# Patient Record
Sex: FEMALE | Race: WHITE | Employment: FULL TIME | URBAN - METROPOLITAN AREA
[De-identification: names, ages, dates, MRNs, and addresses within clinical notes are randomized per-mention and may not be internally consistent; named-entity substitution may affect disease eponyms.]

---
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
## 2018-06-29 ENCOUNTER — HOSPITAL ENCOUNTER (OUTPATIENT)
Age: 18
Setting detail: SPECIMEN
Discharge: HOME | End: 2018-06-29
Payer: COMMERCIAL

## 2018-06-29 LAB
-: ABNORMAL
AMORPHOUS: ABNORMAL
BACTERIA: ABNORMAL
BILIRUBIN URINE: NEGATIVE
CASTS UA: ABNORMAL /LPF (ref 0–8)
COLOR: YELLOW
COMMENT UA: ABNORMAL
CRYSTALS, UA: ABNORMAL /HPF
EPITHELIAL CELLS UA: ABNORMAL /HPF (ref 0–5)
GLUCOSE URINE: NEGATIVE
KETONES, URINE: NEGATIVE
LEUKOCYTE ESTERASE, URINE: ABNORMAL
MUCUS: ABNORMAL
NITRITE, URINE: NEGATIVE
OTHER OBSERVATIONS UA: ABNORMAL
PH UA: 5.5 (ref 5–8)
PROTEIN UA: NEGATIVE
RBC UA: ABNORMAL /HPF (ref 0–4)
RENAL EPITHELIAL, UA: ABNORMAL /HPF
SPECIFIC GRAVITY UA: 1.01 (ref 1–1.03)
TRICHOMONAS: ABNORMAL
TURBIDITY: ABNORMAL
URINE HGB: ABNORMAL
UROBILINOGEN, URINE: NORMAL
WBC UA: ABNORMAL /HPF (ref 0–5)
YEAST: ABNORMAL

## 2018-06-30 LAB
CULTURE: NORMAL
Lab: NORMAL
SPECIMEN DESCRIPTION: NORMAL
STATUS: NORMAL

## 2018-09-11 ENCOUNTER — HOSPITAL ENCOUNTER (OUTPATIENT)
Age: 18
Discharge: HOME | End: 2018-09-11
Payer: COMMERCIAL

## 2018-09-11 LAB
ABSOLUTE EOS #: 0.1 K/UL (ref 0–0.4)
ABSOLUTE IMMATURE GRANULOCYTE: ABNORMAL K/UL (ref 0–0.3)
ABSOLUTE LYMPH #: 1.7 K/UL (ref 1.2–5.2)
ABSOLUTE MONO #: 0.3 K/UL (ref 0.2–0.8)
ALBUMIN SERPL-MCNC: 4.5 G/DL (ref 3.5–5.2)
ALBUMIN/GLOBULIN RATIO: ABNORMAL (ref 1–2.5)
ALP BLD-CCNC: 69 U/L (ref 35–104)
ALT SERPL-CCNC: 11 U/L (ref 5–33)
ANION GAP SERPL CALCULATED.3IONS-SCNC: 11 MMOL/L (ref 9–17)
AST SERPL-CCNC: 14 U/L
BASOPHILS # BLD: 1 % (ref 0–2)
BASOPHILS ABSOLUTE: 0 K/UL (ref 0–0.2)
BILIRUB SERPL-MCNC: 0.2 MG/DL (ref 0.3–1.2)
BUN BLDV-MCNC: 13 MG/DL (ref 6–20)
BUN/CREAT BLD: 28 (ref 9–20)
CALCIUM SERPL-MCNC: 9.6 MG/DL (ref 8.6–10.4)
CHLORIDE BLD-SCNC: 104 MMOL/L (ref 98–107)
CO2: 25 MMOL/L (ref 20–31)
CREAT SERPL-MCNC: 0.46 MG/DL (ref 0.5–0.9)
DIFFERENTIAL TYPE: ABNORMAL
EOSINOPHILS RELATIVE PERCENT: 1 % (ref 1–4)
FERRITIN: 11 UG/L (ref 13–150)
GFR AFRICAN AMERICAN: >60 ML/MIN
GFR NON-AFRICAN AMERICAN: >60 ML/MIN
GFR SERPL CREATININE-BSD FRML MDRD: ABNORMAL ML/MIN/{1.73_M2}
GFR SERPL CREATININE-BSD FRML MDRD: ABNORMAL ML/MIN/{1.73_M2}
GLUCOSE BLD-MCNC: 95 MG/DL (ref 70–99)
HCT VFR BLD CALC: 38.2 % (ref 36–46)
HEMOGLOBIN: 12.5 G/DL (ref 12–16)
IMMATURE GRANULOCYTES: ABNORMAL %
IRON SATURATION: 16 % (ref 20–55)
IRON: 66 UG/DL (ref 37–145)
LYMPHOCYTES # BLD: 33 % (ref 25–45)
MCH RBC QN AUTO: 27.1 PG (ref 26–34)
MCHC RBC AUTO-ENTMCNC: 32.8 G/DL (ref 31–37)
MCV RBC AUTO: 82.6 FL (ref 80–100)
MONOCYTES # BLD: 6 % (ref 2–8)
NRBC AUTOMATED: ABNORMAL PER 100 WBC
PDW BLD-RTO: 14.6 % (ref 11.5–14.5)
PLATELET # BLD: 354 K/UL (ref 130–400)
PLATELET ESTIMATE: ABNORMAL
PMV BLD AUTO: ABNORMAL FL (ref 6–12)
POTASSIUM SERPL-SCNC: 4.1 MMOL/L (ref 3.7–5.3)
RBC # BLD: 4.63 M/UL (ref 4–5.2)
RBC # BLD: ABNORMAL 10*6/UL
SEG NEUTROPHILS: 59 % (ref 34–64)
SEGMENTED NEUTROPHILS ABSOLUTE COUNT: 3.2 K/UL (ref 1.8–8)
SODIUM BLD-SCNC: 140 MMOL/L (ref 135–144)
THYROXINE, FREE: 1.71 NG/DL (ref 0.93–1.7)
TOTAL IRON BINDING CAPACITY: 404 UG/DL (ref 250–450)
TOTAL PROTEIN: 7.5 G/DL (ref 6.4–8.3)
TSH SERPL DL<=0.05 MIU/L-ACNC: <0.01 MIU/L (ref 0.3–5)
UNSATURATED IRON BINDING CAPACITY: 338 UG/DL (ref 112–347)
VITAMIN B-12: 621 PG/ML (ref 232–1245)
WBC # BLD: 5.3 K/UL (ref 4.5–13.5)
WBC # BLD: ABNORMAL 10*3/UL

## 2018-09-11 PROCEDURE — 86376 MICROSOMAL ANTIBODY EACH: CPT

## 2018-09-11 PROCEDURE — 84443 ASSAY THYROID STIM HORMONE: CPT

## 2018-09-11 PROCEDURE — 82728 ASSAY OF FERRITIN: CPT

## 2018-09-11 PROCEDURE — 83550 IRON BINDING TEST: CPT

## 2018-09-11 PROCEDURE — 84439 ASSAY OF FREE THYROXINE: CPT

## 2018-09-11 PROCEDURE — 85025 COMPLETE CBC W/AUTO DIFF WBC: CPT

## 2018-09-11 PROCEDURE — 83540 ASSAY OF IRON: CPT

## 2018-09-11 PROCEDURE — 86800 THYROGLOBULIN ANTIBODY: CPT

## 2018-09-11 PROCEDURE — 82607 VITAMIN B-12: CPT

## 2018-09-11 PROCEDURE — 80053 COMPREHEN METABOLIC PANEL: CPT

## 2018-09-11 PROCEDURE — 36415 COLL VENOUS BLD VENIPUNCTURE: CPT

## 2018-09-11 PROCEDURE — 82306 VITAMIN D 25 HYDROXY: CPT

## 2018-09-12 LAB
THYROGLOBULIN AB: 57.5 IU/ML (ref 0–40)
THYROID PEROXIDASE (TPO) AB: 15.5 IU/ML (ref 0–35)

## 2018-09-14 LAB — VITAMIN D 25-HYDROXY: 41.9 NG/ML (ref 30–100)

## 2018-09-18 ENCOUNTER — HOSPITAL ENCOUNTER (OUTPATIENT)
Dept: ULTRASOUND IMAGING | Facility: CLINIC | Age: 18
Discharge: HOME | End: 2018-09-20
Payer: COMMERCIAL

## 2018-09-18 DIAGNOSIS — E05.90 THYROTOXICOSIS WITHOUT THYROID STORM, UNSPECIFIED THYROTOXICOSIS TYPE: ICD-10-CM

## 2018-09-18 PROCEDURE — 76536 US EXAM OF HEAD AND NECK: CPT

## 2018-10-08 ENCOUNTER — HOSPITAL ENCOUNTER (OUTPATIENT)
Age: 18
Discharge: HOME | End: 2018-10-08
Payer: COMMERCIAL

## 2018-10-08 LAB
IRON SATURATION: 10 % (ref 20–55)
IRON: 37 UG/DL (ref 37–145)
T3 FREE: 2.8 PG/ML (ref 2.02–4.43)
THYROXINE, FREE: 0.91 NG/DL (ref 0.93–1.7)
TOTAL IRON BINDING CAPACITY: 384 UG/DL (ref 250–450)
TSH SERPL DL<=0.05 MIU/L-ACNC: <0.01 MIU/L (ref 0.3–5)
UNSATURATED IRON BINDING CAPACITY: 347 UG/DL (ref 112–347)

## 2018-10-08 PROCEDURE — 36415 COLL VENOUS BLD VENIPUNCTURE: CPT

## 2018-10-08 PROCEDURE — 83540 ASSAY OF IRON: CPT

## 2018-10-08 PROCEDURE — 84445 ASSAY OF TSI GLOBULIN: CPT

## 2018-10-08 PROCEDURE — 84481 FREE ASSAY (FT-3): CPT

## 2018-10-08 PROCEDURE — 84439 ASSAY OF FREE THYROXINE: CPT

## 2018-10-08 PROCEDURE — 83550 IRON BINDING TEST: CPT

## 2018-10-08 PROCEDURE — 84443 ASSAY THYROID STIM HORMONE: CPT

## 2018-10-11 LAB — THYROID STIMULATING IMMUNOGLOB: 127 %

## 2018-11-27 ENCOUNTER — HOSPITAL ENCOUNTER (OUTPATIENT)
Age: 18
Discharge: HOME | End: 2018-11-27
Payer: COMMERCIAL

## 2018-11-27 LAB
THYROXINE, FREE: 0.77 NG/DL (ref 0.93–1.7)
TSH SERPL DL<=0.05 MIU/L-ACNC: 2.85 MIU/L (ref 0.3–5)

## 2018-11-27 PROCEDURE — 83540 ASSAY OF IRON: CPT

## 2018-11-27 PROCEDURE — 84439 ASSAY OF FREE THYROXINE: CPT

## 2018-11-27 PROCEDURE — 36415 COLL VENOUS BLD VENIPUNCTURE: CPT

## 2018-11-27 PROCEDURE — 83550 IRON BINDING TEST: CPT

## 2018-11-27 PROCEDURE — 84443 ASSAY THYROID STIM HORMONE: CPT

## 2018-11-27 PROCEDURE — 84481 FREE ASSAY (FT-3): CPT

## 2018-11-28 LAB
IRON SATURATION: 38 % (ref 20–55)
IRON: 134 UG/DL (ref 37–145)
T3 FREE: 2.49 PG/ML (ref 2.02–4.43)
TOTAL IRON BINDING CAPACITY: 357 UG/DL (ref 250–450)
UNSATURATED IRON BINDING CAPACITY: 223 UG/DL (ref 112–347)

## 2019-01-08 ENCOUNTER — HOSPITAL ENCOUNTER (OUTPATIENT)
Age: 19
Discharge: HOME | End: 2019-01-08
Payer: COMMERCIAL

## 2019-01-08 LAB
IRON SATURATION: 38 % (ref 20–55)
IRON: 134 UG/DL (ref 37–145)
T3 FREE: 2.63 PG/ML (ref 2.02–4.43)
THYROXINE, FREE: 0.71 NG/DL (ref 0.93–1.7)
TOTAL IRON BINDING CAPACITY: 350 UG/DL (ref 250–450)
TSH SERPL DL<=0.05 MIU/L-ACNC: 2.55 MIU/L (ref 0.3–5)
UNSATURATED IRON BINDING CAPACITY: 216 UG/DL (ref 112–347)

## 2019-01-08 PROCEDURE — 84439 ASSAY OF FREE THYROXINE: CPT

## 2019-01-08 PROCEDURE — 83550 IRON BINDING TEST: CPT

## 2019-01-08 PROCEDURE — 83540 ASSAY OF IRON: CPT

## 2019-01-08 PROCEDURE — 84443 ASSAY THYROID STIM HORMONE: CPT

## 2019-01-08 PROCEDURE — 36415 COLL VENOUS BLD VENIPUNCTURE: CPT

## 2019-01-08 PROCEDURE — 84481 FREE ASSAY (FT-3): CPT

## 2019-02-28 ENCOUNTER — TELEPHONE (OUTPATIENT)
Dept: OBGYN CLINIC | Age: 19
End: 2019-02-28

## 2019-03-04 ENCOUNTER — HOSPITAL ENCOUNTER (OUTPATIENT)
Age: 19
Setting detail: SPECIMEN
Discharge: HOME | End: 2019-03-04
Payer: COMMERCIAL

## 2019-03-04 ENCOUNTER — OFFICE VISIT (OUTPATIENT)
Dept: OBGYN CLINIC | Age: 19
End: 2019-03-04
Payer: COMMERCIAL

## 2019-03-04 VITALS
WEIGHT: 142 LBS | SYSTOLIC BLOOD PRESSURE: 112 MMHG | BODY MASS INDEX: 27.88 KG/M2 | DIASTOLIC BLOOD PRESSURE: 72 MMHG | HEIGHT: 60 IN

## 2019-03-04 DIAGNOSIS — Z00.00 ENCOUNTER FOR ANNUAL PHYSICAL EXAMINATION EXCLUDING GYNECOLOGICAL EXAMINATION IN A PATIENT OLDER THAN 17 YEARS: ICD-10-CM

## 2019-03-04 DIAGNOSIS — Z20.2 POSSIBLE EXPOSURE TO STD: ICD-10-CM

## 2019-03-04 DIAGNOSIS — Z20.2 POSSIBLE EXPOSURE TO STD: Primary | ICD-10-CM

## 2019-03-04 LAB
DIRECT EXAM: ABNORMAL
Lab: ABNORMAL
SPECIMEN DESCRIPTION: ABNORMAL

## 2019-03-04 PROCEDURE — 99395 PREV VISIT EST AGE 18-39: CPT | Performed by: OBSTETRICS & GYNECOLOGY

## 2019-03-04 RX ORDER — PROPYLTHIOURACIL 50 MG/1
TABLET ORAL 3 TIMES DAILY
COMMUNITY
End: 2020-03-06 | Stop reason: ALTCHOICE

## 2019-03-04 ASSESSMENT — ENCOUNTER SYMPTOMS
VOMITING: 0
DIARRHEA: 0
ABDOMINAL PAIN: 0
WHEEZING: 0
NAUSEA: 0
COUGH: 0
CONSTIPATION: 0

## 2019-03-05 ENCOUNTER — TELEPHONE (OUTPATIENT)
Dept: OBGYN CLINIC | Age: 19
End: 2019-03-05

## 2019-03-05 LAB
C TRACH DNA GENITAL QL NAA+PROBE: NEGATIVE
N. GONORRHOEAE DNA: NEGATIVE
SPECIMEN DESCRIPTION: NORMAL

## 2019-03-05 RX ORDER — METRONIDAZOLE 500 MG/1
500 TABLET ORAL 2 TIMES DAILY
Qty: 14 TABLET | Refills: 0 | Status: SHIPPED | OUTPATIENT
Start: 2019-03-05 | End: 2019-03-12

## 2019-03-18 ENCOUNTER — HOSPITAL ENCOUNTER (OUTPATIENT)
Age: 19
Discharge: HOME | End: 2019-03-18
Payer: COMMERCIAL

## 2019-03-18 LAB
T3 FREE: 2.81 PG/ML (ref 2.02–4.43)
THYROXINE, FREE: 0.84 NG/DL (ref 0.93–1.7)
TSH SERPL DL<=0.05 MIU/L-ACNC: 2.31 MIU/L (ref 0.3–5)

## 2019-03-18 PROCEDURE — 84481 FREE ASSAY (FT-3): CPT

## 2019-03-18 PROCEDURE — 36415 COLL VENOUS BLD VENIPUNCTURE: CPT

## 2019-03-18 PROCEDURE — 84439 ASSAY OF FREE THYROXINE: CPT

## 2019-03-18 PROCEDURE — 84443 ASSAY THYROID STIM HORMONE: CPT

## 2019-06-05 ENCOUNTER — HOSPITAL ENCOUNTER (OUTPATIENT)
Age: 19
Discharge: HOME | End: 2019-06-05
Payer: COMMERCIAL

## 2019-06-05 LAB
T3 FREE: 2.98 PG/ML (ref 2.02–4.43)
THYROXINE, FREE: 0.91 NG/DL (ref 0.93–1.7)
TSH SERPL DL<=0.05 MIU/L-ACNC: 1.58 MIU/L (ref 0.3–5)

## 2019-06-05 PROCEDURE — 84481 FREE ASSAY (FT-3): CPT

## 2019-06-05 PROCEDURE — 36415 COLL VENOUS BLD VENIPUNCTURE: CPT

## 2019-06-05 PROCEDURE — 84443 ASSAY THYROID STIM HORMONE: CPT

## 2019-06-05 PROCEDURE — 84439 ASSAY OF FREE THYROXINE: CPT

## 2019-07-20 ENCOUNTER — HOSPITAL ENCOUNTER (OUTPATIENT)
Age: 19
Discharge: HOME | End: 2019-07-20
Payer: COMMERCIAL

## 2019-07-20 LAB
T3 FREE: 2.93 PG/ML (ref 2.02–4.43)
THYROXINE, FREE: 1 NG/DL (ref 0.93–1.7)
TSH SERPL DL<=0.05 MIU/L-ACNC: 1.35 MIU/L (ref 0.3–5)

## 2019-07-20 PROCEDURE — 36415 COLL VENOUS BLD VENIPUNCTURE: CPT

## 2019-07-20 PROCEDURE — 84439 ASSAY OF FREE THYROXINE: CPT

## 2019-07-20 PROCEDURE — 84443 ASSAY THYROID STIM HORMONE: CPT

## 2019-07-20 PROCEDURE — 84481 FREE ASSAY (FT-3): CPT

## 2019-10-15 ENCOUNTER — OFFICE VISIT (OUTPATIENT)
Dept: OBGYN CLINIC | Age: 19
End: 2019-10-15
Payer: COMMERCIAL

## 2019-10-15 VITALS
BODY MASS INDEX: 29.64 KG/M2 | WEIGHT: 151 LBS | HEIGHT: 60 IN | SYSTOLIC BLOOD PRESSURE: 114 MMHG | DIASTOLIC BLOOD PRESSURE: 82 MMHG

## 2019-10-15 DIAGNOSIS — N92.3 IMB (INTERMENSTRUAL BLEEDING): ICD-10-CM

## 2019-10-15 DIAGNOSIS — Z97.5 IUD (INTRAUTERINE DEVICE) IN PLACE: Primary | ICD-10-CM

## 2019-10-15 PROCEDURE — 99213 OFFICE O/P EST LOW 20 MIN: CPT | Performed by: NURSE PRACTITIONER

## 2019-10-15 RX ORDER — NAPROXEN 500 MG/1
500 TABLET ORAL 2 TIMES DAILY WITH MEALS
Qty: 60 TABLET | Refills: 1 | Status: SHIPPED | OUTPATIENT
Start: 2019-10-15 | End: 2020-05-11

## 2019-10-15 ASSESSMENT — ENCOUNTER SYMPTOMS
BACK PAIN: 0
SHORTNESS OF BREATH: 0
COUGH: 0
ABDOMINAL PAIN: 0
ABDOMINAL DISTENTION: 0
DIARRHEA: 0
CONSTIPATION: 0

## 2020-01-23 ENCOUNTER — HOSPITAL ENCOUNTER (OUTPATIENT)
Age: 20
Discharge: HOME | End: 2020-01-23
Payer: COMMERCIAL

## 2020-01-23 LAB
T3 FREE: 2.69 PG/ML (ref 2.02–4.43)
THYROXINE, FREE: 0.93 NG/DL (ref 0.93–1.7)
TSH SERPL DL<=0.05 MIU/L-ACNC: 1.78 MIU/L (ref 0.3–5)

## 2020-01-23 PROCEDURE — 84481 FREE ASSAY (FT-3): CPT

## 2020-01-23 PROCEDURE — 84443 ASSAY THYROID STIM HORMONE: CPT

## 2020-01-23 PROCEDURE — 36415 COLL VENOUS BLD VENIPUNCTURE: CPT

## 2020-01-23 PROCEDURE — 84439 ASSAY OF FREE THYROXINE: CPT

## 2020-03-06 ENCOUNTER — OFFICE VISIT (OUTPATIENT)
Dept: PRIMARY CARE CLINIC | Age: 20
End: 2020-03-06
Payer: COMMERCIAL

## 2020-03-06 VITALS
WEIGHT: 155 LBS | DIASTOLIC BLOOD PRESSURE: 66 MMHG | HEIGHT: 59 IN | HEART RATE: 85 BPM | RESPIRATION RATE: 16 BRPM | BODY MASS INDEX: 31.25 KG/M2 | SYSTOLIC BLOOD PRESSURE: 100 MMHG | OXYGEN SATURATION: 98 %

## 2020-03-06 PROBLEM — E05.00 GRAVES DISEASE: Status: ACTIVE | Noted: 2020-03-06

## 2020-03-06 PROBLEM — E05.90 HYPERTHYROIDISM: Status: ACTIVE | Noted: 2018-09-11

## 2020-03-06 PROCEDURE — 99203 OFFICE O/P NEW LOW 30 MIN: CPT | Performed by: INTERNAL MEDICINE

## 2020-03-06 ASSESSMENT — PATIENT HEALTH QUESTIONNAIRE - PHQ9
SUM OF ALL RESPONSES TO PHQ QUESTIONS 1-9: 0
SUM OF ALL RESPONSES TO PHQ QUESTIONS 1-9: 0
2. FEELING DOWN, DEPRESSED OR HOPELESS: 0
SUM OF ALL RESPONSES TO PHQ9 QUESTIONS 1 & 2: 0
1. LITTLE INTEREST OR PLEASURE IN DOING THINGS: 0

## 2020-03-08 ENCOUNTER — HOSPITAL ENCOUNTER (OUTPATIENT)
Age: 20
Discharge: HOME | End: 2020-03-08
Payer: COMMERCIAL

## 2020-03-08 LAB
GLUCOSE BLD-MCNC: 95 MG/DL (ref 70–99)
INSULIN COMMENT: NORMAL
INSULIN REFERENCE RANGE:: NORMAL
INSULIN: 8.9 MU/L
T3 FREE: 2.84 PG/ML (ref 2.02–4.43)
THYROXINE, FREE: 1.01 NG/DL (ref 0.93–1.7)
TSH SERPL DL<=0.05 MIU/L-ACNC: 1.06 MIU/L (ref 0.3–5)

## 2020-03-08 PROCEDURE — 84443 ASSAY THYROID STIM HORMONE: CPT

## 2020-03-08 PROCEDURE — 83525 ASSAY OF INSULIN: CPT

## 2020-03-08 PROCEDURE — 82947 ASSAY GLUCOSE BLOOD QUANT: CPT

## 2020-03-08 PROCEDURE — 36415 COLL VENOUS BLD VENIPUNCTURE: CPT

## 2020-03-08 PROCEDURE — 84439 ASSAY OF FREE THYROXINE: CPT

## 2020-03-08 PROCEDURE — 84481 FREE ASSAY (FT-3): CPT

## 2020-03-09 ENCOUNTER — HOSPITAL ENCOUNTER (OUTPATIENT)
Age: 20
Setting detail: SPECIMEN
Discharge: HOME | End: 2020-03-09
Payer: COMMERCIAL

## 2020-03-09 PROCEDURE — 82533 TOTAL CORTISOL: CPT

## 2020-03-11 LAB — CORTISOL SALIVARY: 0.03 UG/DL

## 2020-03-15 ASSESSMENT — ENCOUNTER SYMPTOMS
CONSTIPATION: 0
ABDOMINAL PAIN: 0
SINUS PRESSURE: 0
BACK PAIN: 0
NAUSEA: 0
SINUS PAIN: 0
VOMITING: 0
COUGH: 0
ABDOMINAL DISTENTION: 0
SHORTNESS OF BREATH: 0
DIARRHEA: 0
WHEEZING: 0

## 2020-03-15 NOTE — PROGRESS NOTES
[facility] PRIMARY CARE  [address]   [address]  [address] [address]  Dept: [phone]  Dept Fax: [phone]    Quita Pitts is a 22 y.o. female who presents today for her medical conditions/complaints as noted below. Chief Complaint   Patient presents with    New Patient     Establish Care, pt c/o lightheadedness       HPI:     This is a 75-year-old female who is here to establish care. She has past medical history of Graves' disease/hyper thyroidism. Today her TSH is normal and she has been doing well. Her blood pressure is low around his 100/66 and pulse at 85 and she complains of lightheadedness occasionally. No other complaints or concerns. BMI is at 31 and advised her to diet and exercise.       Hemoglobin A1C (%)   Date Value   08/22/2015 4.9             ( goal A1C is < 7)   No results found for: LABMICR  LDL Cholesterol (mg/dL)   Date Value   08/22/2015 136 (H)       (goal LDL is <100)   AST (U/L)   Date Value   09/11/2018 14     ALT (U/L)   Date Value   09/11/2018 11     BUN (mg/dL)   Date Value   09/11/2018 13     BP Readings from Last 3 Encounters:   03/06/20 100/66   10/15/19 114/82   03/04/19 112/72          (goal 120/80)    Past Medical History:   Diagnosis Date    Thyroid disease 10/2018    Graves Disease      Past Surgical History:   Procedure Laterality Date    BREAST ENHANCEMENT SURGERY  2016    INTRAUTERINE DEVICE INSERTION  08/2016    Paragaadlen Borrego Nose    TYMPANOSTOMY TUBE PLACEMENT      WISDOM TOOTH EXTRACTION         Family History   Problem Relation Age of Onset    Other Father         Autoimmune disease    Thyroid Disease Father     Asthma Brother        Social History     Tobacco Use    Smoking status: Never Smoker    Smokeless tobacco: Never Used   Substance Use Topics    Alcohol use: Yes     Comment: 1-2 drinks weekly      Current Outpatient Medications   Medication Sig Dispense Refill    naproxen (NAPROSYN) 500 MG tablet Take 1 tablet by mouth 2 times daily (with meals) 60 tablet 1     No current facility-administered medications for this visit. Allergies   Allergen Reactions    Latex     Tapazole [Methimazole] Hives       Health Maintenance   Topic Date Due    Hepatitis A vaccine (2 of 2 - 2-dose series) 11/04/2010    HIV screen  07/27/2013    HPV vaccine (3 - 3-dose series) 12/19/2014    Cervical cancer screen  07/27/2019    Chlamydia screen  03/04/2020    Flu vaccine (1) 03/06/2021 (Originally 9/1/2019)    DTaP/Tdap/Td vaccine (7 - Td) 05/04/2020    TSH testing  03/08/2021    Shingles Vaccine (1 of 2) 07/27/2048    Hepatitis B vaccine  Completed    Hib vaccine  Completed    Varicella vaccine  Completed    Meningococcal (ACWY) vaccine  Completed    Pneumococcal 0-64 years Vaccine  Aged Out       Subjective:      Review of Systems   Constitutional: Negative for activity change, appetite change, chills, fatigue and fever. HENT: Negative for congestion, ear pain, hearing loss, nosebleeds, sinus pressure, sinus pain and sneezing. Eyes: Negative for visual disturbance. Respiratory: Negative for cough, shortness of breath and wheezing. Cardiovascular: Negative for chest pain and palpitations. Gastrointestinal: Negative for abdominal distention, abdominal pain, constipation, diarrhea, nausea and vomiting. Endocrine: Negative for cold intolerance, heat intolerance, polydipsia, polyphagia and polyuria. Genitourinary: Negative for difficulty urinating, menstrual problem, vaginal bleeding and vaginal discharge. Musculoskeletal: Negative for back pain and joint swelling. Skin: Negative for rash. Neurological: Negative for numbness and headaches. Psychiatric/Behavioral: Negative for sleep disturbance. The patient is not nervous/anxious. All other systems reviewed and are negative. Objective:     Physical Exam  Vitals signs and nursing note reviewed.    Constitutional:       General:

## 2020-04-02 ENCOUNTER — TELEMEDICINE (OUTPATIENT)
Dept: OBGYN CLINIC | Age: 20
End: 2020-04-02
Payer: COMMERCIAL

## 2020-04-02 PROCEDURE — 99213 OFFICE O/P EST LOW 20 MIN: CPT | Performed by: OBSTETRICS & GYNECOLOGY

## 2020-04-02 RX ORDER — METRONIDAZOLE 500 MG/1
500 TABLET ORAL 2 TIMES DAILY
Qty: 14 TABLET | Refills: 0 | Status: SHIPPED | OUTPATIENT
Start: 2020-04-02 | End: 2020-04-09

## 2020-04-02 ASSESSMENT — ENCOUNTER SYMPTOMS
DIARRHEA: 0
CONSTIPATION: 0
COUGH: 0
ABDOMINAL PAIN: 0
WHEEZING: 0
VOMITING: 0
NAUSEA: 0

## 2020-05-11 ENCOUNTER — OFFICE VISIT (OUTPATIENT)
Dept: OBGYN CLINIC | Age: 20
End: 2020-05-11
Payer: COMMERCIAL

## 2020-05-11 ENCOUNTER — HOSPITAL ENCOUNTER (OUTPATIENT)
Age: 20
Setting detail: SPECIMEN
Discharge: HOME | End: 2020-05-11
Payer: COMMERCIAL

## 2020-05-11 VITALS
WEIGHT: 153 LBS | SYSTOLIC BLOOD PRESSURE: 120 MMHG | HEIGHT: 60 IN | BODY MASS INDEX: 30.04 KG/M2 | DIASTOLIC BLOOD PRESSURE: 84 MMHG

## 2020-05-11 PROCEDURE — 99395 PREV VISIT EST AGE 18-39: CPT | Performed by: OBSTETRICS & GYNECOLOGY

## 2020-05-11 ASSESSMENT — ENCOUNTER SYMPTOMS
DIARRHEA: 0
COUGH: 0
NAUSEA: 0
VOMITING: 0
ABDOMINAL PAIN: 0
CONSTIPATION: 0
WHEEZING: 0

## 2020-05-11 NOTE — PROGRESS NOTES
chills and fever. HENT: Negative for hearing loss. Respiratory: Negative for cough and wheezing. Cardiovascular: Negative for chest pain and palpitations. Gastrointestinal: Negative for abdominal pain, constipation, diarrhea, nausea and vomiting. Genitourinary: Negative for dysuria, frequency and urgency. Musculoskeletal: Negative for myalgias. Skin: Negative for rash. Neurological: Negative for dizziness, weakness and headaches. Hematological: Does not bruise/bleed easily. Psychiatric/Behavioral: Negative for suicidal ideas. /84 (Position: Sitting, Cuff Size: Medium Adult)   Ht 5' (1.524 m)   Wt 153 lb (69.4 kg)   LMP 05/03/2020   BMI 29.88 kg/m²       Physical Exam  Constitutional:       Appearance: She is well-developed. HENT:      Head: Normocephalic. Neck:      Thyroid: No thyromegaly. Vascular: No JVD. Cardiovascular:      Rate and Rhythm: Normal rate and regular rhythm. Pulmonary:      Effort: Pulmonary effort is normal. No respiratory distress. Breath sounds: Normal breath sounds. No wheezing or rales. Chest:      Chest wall: No tenderness. Breasts: Breasts are symmetrical.         Right: No inverted nipple, mass, nipple discharge, skin change or tenderness. Left: No inverted nipple, mass, nipple discharge, skin change or tenderness. Comments: Well healed scars from breast augmentation  Abdominal:      General: Bowel sounds are normal. There is no distension. Palpations: Abdomen is soft. Tenderness: There is no abdominal tenderness. Genitourinary:     Exam position: Supine. Labia:         Right: No rash, tenderness, lesion or injury. Left: No rash, tenderness, lesion or injury. Vagina: Normal. No foreign body. No erythema or bleeding. Cervix: No cervical motion tenderness, discharge or friability. Uterus: Not deviated, not enlarged, not fixed and not tender.        Adnexa:         Right:

## 2020-05-12 ENCOUNTER — TELEPHONE (OUTPATIENT)
Dept: OBGYN CLINIC | Age: 20
End: 2020-05-12

## 2020-05-12 LAB
C TRACH DNA GENITAL QL NAA+PROBE: NEGATIVE
DIRECT EXAM: ABNORMAL
Lab: ABNORMAL
N. GONORRHOEAE DNA: NEGATIVE
SPECIMEN DESCRIPTION: ABNORMAL
SPECIMEN DESCRIPTION: NORMAL

## 2020-05-12 RX ORDER — METRONIDAZOLE 500 MG/1
500 TABLET ORAL 2 TIMES DAILY
Qty: 20 TABLET | Refills: 0 | Status: SHIPPED
Start: 2020-05-12 | End: 2020-05-22 | Stop reason: SINTOL

## 2020-05-14 LAB — CYTOLOGY REPORT: NORMAL

## 2020-05-15 ENCOUNTER — TELEPHONE (OUTPATIENT)
Dept: OBGYN CLINIC | Age: 20
End: 2020-05-15

## 2020-05-15 RX ORDER — METRONIDAZOLE 7.5 MG/G
GEL VAGINAL
Qty: 1 TUBE | Refills: 0 | Status: SHIPPED | OUTPATIENT
Start: 2020-05-15 | End: 2020-05-22

## 2020-05-15 RX ORDER — ONDANSETRON 4 MG/1
4 TABLET, ORALLY DISINTEGRATING ORAL EVERY 8 HOURS PRN
Qty: 24 TABLET | Refills: 1 | Status: SHIPPED | OUTPATIENT
Start: 2020-05-15 | End: 2021-06-01

## 2020-05-22 ENCOUNTER — HOSPITAL ENCOUNTER (OUTPATIENT)
Age: 20
Setting detail: SPECIMEN
Discharge: HOME | End: 2020-05-22
Payer: COMMERCIAL

## 2020-05-22 ENCOUNTER — OFFICE VISIT (OUTPATIENT)
Dept: PRIMARY CARE CLINIC | Age: 20
End: 2020-05-22
Payer: COMMERCIAL

## 2020-05-22 VITALS
RESPIRATION RATE: 12 BRPM | OXYGEN SATURATION: 98 % | HEART RATE: 73 BPM | BODY MASS INDEX: 29.8 KG/M2 | SYSTOLIC BLOOD PRESSURE: 114 MMHG | WEIGHT: 151.8 LBS | DIASTOLIC BLOOD PRESSURE: 86 MMHG | HEIGHT: 60 IN

## 2020-05-22 LAB
BILIRUBIN, POC: NORMAL
BLOOD URINE, POC: NORMAL
CLARITY, POC: CLEAR
COLOR, POC: YELLOW
GLUCOSE URINE, POC: NORMAL
KETONES, POC: NORMAL
LEUKOCYTE EST, POC: NORMAL
NITRITE, POC: NORMAL
PH, POC: 7
PROTEIN, POC: NORMAL
SPECIFIC GRAVITY, POC: <1.005
UROBILINOGEN, POC: 0.2

## 2020-05-22 PROCEDURE — 99213 OFFICE O/P EST LOW 20 MIN: CPT | Performed by: NURSE PRACTITIONER

## 2020-05-22 PROCEDURE — 81003 URINALYSIS AUTO W/O SCOPE: CPT | Performed by: NURSE PRACTITIONER

## 2020-05-22 RX ORDER — SULFAMETHOXAZOLE AND TRIMETHOPRIM 800; 160 MG/1; MG/1
1 TABLET ORAL 2 TIMES DAILY
Qty: 14 TABLET | Refills: 0 | Status: SHIPPED | OUTPATIENT
Start: 2020-05-22 | End: 2020-05-29

## 2020-05-22 ASSESSMENT — ENCOUNTER SYMPTOMS
DIARRHEA: 0
NAUSEA: 0
CHEST TIGHTNESS: 0
COLOR CHANGE: 0
RHINORRHEA: 0
VOMITING: 0
ABDOMINAL PAIN: 0
SHORTNESS OF BREATH: 0
SORE THROAT: 0

## 2020-05-22 NOTE — PROGRESS NOTES
History:   Procedure Laterality Date    BREAST ENHANCEMENT SURGERY  2016    INTRAUTERINE DEVICE INSERTION  08/2016    Sheila Mata Civil    TYMPANOSTOMY TUBE PLACEMENT      WISDOM TOOTH EXTRACTION         Family History   Problem Relation Age of Onset    Other Father         Autoimmune disease    Thyroid Disease Father     Asthma Brother        Social History     Tobacco Use    Smoking status: Never Smoker    Smokeless tobacco: Never Used   Substance Use Topics    Alcohol use: Yes     Comment: 1-2 drinks weekly      Current Outpatient Medications   Medication Sig Dispense Refill    sulfamethoxazole-trimethoprim (BACTRIM DS;SEPTRA DS) 800-160 MG per tablet Take 1 tablet by mouth 2 times daily for 7 days 14 tablet 0    ondansetron (ZOFRAN ODT) 4 MG disintegrating tablet Take 1 tablet by mouth every 8 hours as needed for Nausea (Patient not taking: Reported on 5/22/2020) 24 tablet 1    metroNIDAZOLE (METROGEL VAGINAL) 0.75 % vaginal gel One applicator intravaginally every night for 5 days (Patient not taking: Reported on 5/22/2020) 1 Tube 0     No current facility-administered medications for this visit. Allergies   Allergen Reactions    Latex     Tapazole [Methimazole] Hives       Health Maintenance   Topic Date Due    Hepatitis A vaccine (2 of 2 - 2-dose series) 11/04/2010    HIV screen  07/27/2013    HPV vaccine (3 - 3-dose series) 12/19/2014    DTaP/Tdap/Td vaccine (7 - Td) 05/04/2020    Flu vaccine (Season Ended) 03/06/2021 (Originally 9/1/2020)    TSH testing  03/08/2021    Chlamydia screen  05/11/2021    Cervical cancer screen  05/11/2023    Hepatitis B vaccine  Completed    Hib vaccine  Completed    Varicella vaccine  Completed    Meningococcal (ACWY) vaccine  Completed    Pneumococcal 0-64 years Vaccine  Aged Out       Subjective:      Review of Systems   Constitutional: Negative for activity change, chills, fatigue and fever.    HENT: Negative for congestion, rhinorrhea and sore throat. Eyes: Negative for visual disturbance. Respiratory: Negative for chest tightness and shortness of breath. Cardiovascular: Negative for chest pain and palpitations. Gastrointestinal: Negative for abdominal pain, diarrhea, nausea and vomiting. Endocrine: Negative for polydipsia. Genitourinary: Positive for dysuria, frequency, pelvic pain and urgency. Negative for difficulty urinating, flank pain, genital sores, hematuria, vaginal bleeding, vaginal discharge and vaginal pain. Musculoskeletal: Negative for arthralgias and myalgias. Skin: Negative for color change. Neurological: Negative for weakness and headaches. Psychiatric/Behavioral: Negative for behavioral problems. The patient is not nervous/anxious. All other systems reviewed and are negative. Objective:   /86 (Site: Left Upper Arm, Position: Sitting)   Pulse 73   Resp 12   Ht 5' (1.524 m)   Wt 151 lb 12.8 oz (68.9 kg)   LMP 05/03/2020   SpO2 98%   Breastfeeding No   BMI 29.65 kg/m²   Physical Exam  Vitals signs reviewed. Constitutional:       General: She is not in acute distress. Appearance: Normal appearance. She is not ill-appearing or diaphoretic. HENT:      Head: Normocephalic. Eyes:      Pupils: Pupils are equal, round, and reactive to light. Cardiovascular:      Rate and Rhythm: Normal rate and regular rhythm. Pulses: Normal pulses. Heart sounds: Normal heart sounds. Pulmonary:      Effort: Pulmonary effort is normal.      Breath sounds: Normal breath sounds. Skin:     Capillary Refill: Capillary refill takes less than 2 seconds. Neurological:      Mental Status: She is alert and oriented to person, place, and time. Psychiatric:         Mood and Affect: Mood normal.         Behavior: Behavior normal.           :       Diagnosis Orders   1.  Urinary frequency  POCT Urinalysis No Micro (Auto)    Culture, Urine    sulfamethoxazole-trimethoprim (BACTRIM DS;SEPTRA DS) 800-160 MG per tablet   2. Painful urination  sulfamethoxazole-trimethoprim (BACTRIM DS;SEPTRA DS) 800-160 MG per tablet             :          1. Urinary frequency  2. Painful urination  UA negative in office, advised pt that we could wait for culture to treat as UA negative, pt requested to start antibiotic d/t past UTI with pyelo. Given that it is close to weekend and pts symptoms suggestive of UTI, will treat. If culture negative and symptoms persist, recommend follow up with PCP and/or gyn.      - sulfamethoxazole-trimethoprim (BACTRIM DS;SEPTRA DS) 800-160 MG per tablet; Take 1 tablet by mouth 2 times daily for 7 days  Dispense: 14 tablet; Refill: 0      Return if symptoms worsen or fail to improve. Patient given educational materials - see patient instructions. Discussed use, benefit, and side effects of prescribed medications. All patient questions answered. Pt voiced understanding. Reviewed health maintenance. Instructed to continue current medications, diet and exercise. Patient agreed with treatment plan. Follow up as directed.        Electronicallysigned by АНДРЕЙ Sandoval CNP on 5/22/2020 at 10:49 AM

## 2020-05-23 LAB
CULTURE: NO GROWTH
Lab: NORMAL
SPECIMEN DESCRIPTION: NORMAL

## 2020-07-08 ENCOUNTER — HOSPITAL ENCOUNTER (OUTPATIENT)
Age: 20
Discharge: HOME | End: 2020-07-08
Payer: COMMERCIAL

## 2020-07-08 LAB — T3 FREE: 2.68 PG/ML (ref 2.02–4.43)

## 2020-07-08 PROCEDURE — 84439 ASSAY OF FREE THYROXINE: CPT

## 2020-07-08 PROCEDURE — 84443 ASSAY THYROID STIM HORMONE: CPT

## 2020-07-08 PROCEDURE — 84481 FREE ASSAY (FT-3): CPT

## 2020-07-08 PROCEDURE — 36415 COLL VENOUS BLD VENIPUNCTURE: CPT

## 2020-07-09 LAB
THYROXINE, FREE: 0.99 NG/DL (ref 0.93–1.7)
TSH SERPL DL<=0.05 MIU/L-ACNC: 1.13 MIU/L (ref 0.3–5)

## 2020-10-15 ENCOUNTER — HOSPITAL ENCOUNTER (OUTPATIENT)
Age: 20
Discharge: HOME | End: 2020-10-15
Payer: COMMERCIAL

## 2020-10-15 LAB
T3 FREE: 2.75 PG/ML (ref 2.02–4.43)
THYROXINE, FREE: 1.11 NG/DL (ref 0.93–1.7)
TSH SERPL DL<=0.05 MIU/L-ACNC: 0.79 MIU/L (ref 0.3–5)

## 2020-10-15 PROCEDURE — 84443 ASSAY THYROID STIM HORMONE: CPT

## 2020-10-15 PROCEDURE — 36415 COLL VENOUS BLD VENIPUNCTURE: CPT

## 2020-10-15 PROCEDURE — 84481 FREE ASSAY (FT-3): CPT

## 2020-10-15 PROCEDURE — 84439 ASSAY OF FREE THYROXINE: CPT

## 2021-06-01 ENCOUNTER — OFFICE VISIT (OUTPATIENT)
Dept: OBGYN CLINIC | Age: 21
End: 2021-06-01
Payer: COMMERCIAL

## 2021-06-01 ENCOUNTER — HOSPITAL ENCOUNTER (OUTPATIENT)
Age: 21
Setting detail: SPECIMEN
Discharge: HOME | End: 2021-06-01
Payer: COMMERCIAL

## 2021-06-01 VITALS
DIASTOLIC BLOOD PRESSURE: 64 MMHG | WEIGHT: 151.13 LBS | SYSTOLIC BLOOD PRESSURE: 112 MMHG | BODY MASS INDEX: 29.51 KG/M2 | RESPIRATION RATE: 16 BRPM

## 2021-06-01 DIAGNOSIS — N89.8 VAGINAL ODOR: ICD-10-CM

## 2021-06-01 DIAGNOSIS — Z30.431 IUD CHECK UP: Primary | ICD-10-CM

## 2021-06-01 DIAGNOSIS — N89.8 VAGINAL DISCHARGE: ICD-10-CM

## 2021-06-01 PROCEDURE — 99213 OFFICE O/P EST LOW 20 MIN: CPT | Performed by: OBSTETRICS & GYNECOLOGY

## 2021-06-01 NOTE — PROGRESS NOTES
Never    Sexual activity: Yes     Birth control/protection: I.U.D. Other Topics Concern    Not on file   Social History Narrative    Not on file     Social Determinants of Health     Financial Resource Strain:     Difficulty of Paying Living Expenses:    Food Insecurity:     Worried About Running Out of Food in the Last Year:     [address] in the Last Year:    Transportation Needs:     Lack of Transportation (Medical):  Lack of Transportation (Non-Medical):    Physical Activity:     Days of Exercise per Week:     Minutes of Exercise per Session:    Stress:     Feeling of Stress :    Social Connections:     Frequency of Communication with Friends and Family:     Frequency of Social Gatherings with Friends and Family:     Attends Congregational Services:     Active Member of Clubs or Organizations:     Attends Club or Organization Meetings:     Marital Status:    Intimate Partner Violence:     Fear of Current or Ex-Partner:     Emotionally Abused:     Physically Abused:     Sexually Abused:        MEDICATIONS:  No current outpatient medications on file. No current facility-administered medications for this visit. ALLERGIES:  Allergies as of 06/01/2021 - Fully Reviewed 06/01/2021   Allergen Reaction Noted    Latex  12/12/2017    Tapazole [methimazole] Hives 03/04/2019       Health Maintenance:  Health Maintenance Due   Topic Date Due    Hepatitis C screen  Never done    Hepatitis A vaccine (2 of 2 - 2-dose series) 11/04/2010    HIV screen  Never done    HPV vaccine (3 - 3-dose series) 12/19/2014    DTaP/Tdap/Td vaccine (7 - Td) 05/04/2020    Chlamydia screen  05/11/2021       Review Of Systems (11 point):  Constitutional: No fever, chills or malaise;  No weight change or fatigue  Head and Eyes: No vision, Headache, Dizziness or trauma in last 12 months  ENT ROS: No hearing, Tinnitis, sinus or taste problems  Hematological and Lymphatic ROS:No Lymphoma, Von Willebrand's, Hemophillia or Bleeding History  Psych ROS: No Depression, Homicidal thoughts,suicidal thoughts, or anxiety  Breast ROS: No prior breast abnormalities or lumps  Respiratory ROS: No SOB, Pneumoniae,Cough, or Pulmonary Embolism History  Cardiovascular ROS: No Chest Pain with Exertion, Palpitations, Syncope, Edema, Arrhythmia  Gastrointestinal ROS: No Indigestion, Heartburn, Nausea, vomiting, Diarrhea, Constipation,or Bowel Changes; No Bloody Stools or melena  Genito-Urinary ROS: No Dysuria, Hematuria or Nocturia. No Urinary Incontinence or Vaginal Discharge  Musculoskeletal ROS: No Arthralgia, Arthritis,Gout,Osteoporosis or Rheumatism  Neurological ROS: No CVA, Migraines, Epilepsy, Seizure Hx, or Limb Weakness  Dermatological ROS: No Rash, Itching, Hives, Mole Changes or Cancer                                                                                                                                                                                                                                  PHYSICAL Exam:     Constitutional:  Vitals:    06/01/21 1003   BP: 112/64   Site: Left Upper Arm   Position: Sitting   Cuff Size: Large Adult   Resp: 16   Weight: 151 lb 2 oz (68.5 kg)         General Appearance: This  is a well Developed, well Nourished, well groomed female. Skin:  There was a Normal Inspection of the skin without rashes or lesions. Extremities: The patients extremities were without calf tenderness, edema, or varicosities. Abdomen: The abdomen was soft and non-tender. There were good bowel sounds in all quadrants and there was no guarding, rebound or rigidity. Psych: The patient had a normal Orientation to: Time, Place, Person, and Situation  Pelvic Exam:  Vulva and vagina appear normal. Bimanual exam reveals normal uterus and adnexa.  + IUD strings at external OS  Musculosk:  Normal Gait and station was noted. ASSESSMENT:      23 y.o. Annual   Diagnosis Orders   1. IUD check up     2. Vaginal discharge  VAGINITIS DNA PROBE   3.  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

## 2021-06-02 LAB
DIRECT EXAM: NORMAL
Lab: NORMAL
SPECIMEN DESCRIPTION: NORMAL

## 2021-08-12 ENCOUNTER — NURSE TRIAGE (OUTPATIENT)
Dept: OTHER | Facility: CLINIC | Age: 21
End: 2021-08-12

## 2021-08-12 ENCOUNTER — OFFICE VISIT (OUTPATIENT)
Dept: PRIMARY CARE CLINIC | Age: 21
End: 2021-08-12
Payer: COMMERCIAL

## 2021-08-12 VITALS
RESPIRATION RATE: 16 BRPM | HEIGHT: 60 IN | DIASTOLIC BLOOD PRESSURE: 76 MMHG | HEART RATE: 94 BPM | BODY MASS INDEX: 27.64 KG/M2 | WEIGHT: 140.8 LBS | SYSTOLIC BLOOD PRESSURE: 116 MMHG | OXYGEN SATURATION: 98 %

## 2021-08-12 DIAGNOSIS — E66.3 OVERWEIGHT (BMI 25.0-29.9): ICD-10-CM

## 2021-08-12 DIAGNOSIS — E05.90 HYPERTHYROIDISM: ICD-10-CM

## 2021-08-12 DIAGNOSIS — G43.109 MIGRAINE WITH AURA AND WITHOUT STATUS MIGRAINOSUS, NOT INTRACTABLE: Primary | ICD-10-CM

## 2021-08-12 PROCEDURE — 99213 OFFICE O/P EST LOW 20 MIN: CPT | Performed by: STUDENT IN AN ORGANIZED HEALTH CARE EDUCATION/TRAINING PROGRAM

## 2021-08-12 RX ORDER — BUTALBITAL, ACETAMINOPHEN AND CAFFEINE 50; 325; 40 MG/1; MG/1; MG/1
1 TABLET ORAL EVERY 4 HOURS PRN
Qty: 60 TABLET | Refills: 0 | Status: SHIPPED | OUTPATIENT
Start: 2021-08-12 | End: 2022-11-04

## 2021-08-12 SDOH — ECONOMIC STABILITY: TRANSPORTATION INSECURITY
IN THE PAST 12 MONTHS, HAS THE LACK OF TRANSPORTATION KEPT YOU FROM MEDICAL APPOINTMENTS OR FROM GETTING MEDICATIONS?: NO

## 2021-08-12 SDOH — ECONOMIC STABILITY: FOOD INSECURITY: WITHIN THE PAST 12 MONTHS, THE FOOD YOU BOUGHT JUST DIDN'T LAST AND YOU DIDN'T HAVE MONEY TO GET MORE.: NEVER TRUE

## 2021-08-12 SDOH — ECONOMIC STABILITY: TRANSPORTATION INSECURITY
IN THE PAST 12 MONTHS, HAS LACK OF TRANSPORTATION KEPT YOU FROM MEETINGS, WORK, OR FROM GETTING THINGS NEEDED FOR DAILY LIVING?: NO

## 2021-08-12 SDOH — ECONOMIC STABILITY: FOOD INSECURITY: WITHIN THE PAST 12 MONTHS, YOU WORRIED THAT YOUR FOOD WOULD RUN OUT BEFORE YOU GOT MONEY TO BUY MORE.: NEVER TRUE

## 2021-08-12 ASSESSMENT — ENCOUNTER SYMPTOMS
WHEEZING: 0
ABDOMINAL DISTENTION: 0
BACK PAIN: 0
COUGH: 0
SHORTNESS OF BREATH: 0
EYE DISCHARGE: 0
RHINORRHEA: 0
ABDOMINAL PAIN: 0
EYE ITCHING: 0

## 2021-08-12 ASSESSMENT — PATIENT HEALTH QUESTIONNAIRE - PHQ9
1. LITTLE INTEREST OR PLEASURE IN DOING THINGS: 0
SUM OF ALL RESPONSES TO PHQ QUESTIONS 1-9: 0
2. FEELING DOWN, DEPRESSED OR HOPELESS: 0
SUM OF ALL RESPONSES TO PHQ9 QUESTIONS 1 & 2: 0
SUM OF ALL RESPONSES TO PHQ QUESTIONS 1-9: 0
SUM OF ALL RESPONSES TO PHQ QUESTIONS 1-9: 0

## 2021-08-12 ASSESSMENT — SOCIAL DETERMINANTS OF HEALTH (SDOH): HOW HARD IS IT FOR YOU TO PAY FOR THE VERY BASICS LIKE FOOD, HOUSING, MEDICAL CARE, AND HEATING?: SOMEWHAT HARD

## 2021-08-12 NOTE — TELEPHONE ENCOUNTER
Reason for Disposition   SEVERE headache, states 'worst headache' of life    Answer Assessment - Initial Assessment Questions  1. LOCATION: \"Where does it hurt? \"       Middle of forehead size of fist    2. ONSET: \"When did the headache start? \" (Minutes, hours or days)       Yesterday, felt like seeing \"ceiling fan\" flickering in upper eye, \"motion\" and had to lie down. Last night 6/10. Subsided after lying down to 2/10. Back again before bed and again at 0600 this morning 8-9/10. Comes in waves of pain for a few min and then subside. 3. PATTERN: \"Does the pain come and go, or has it been constant since it started? \"      Comes and goes    4. SEVERITY: \"How bad is the pain? \" and \"What does it keep you from doing? \"  (e.g., Scale 1-10; mild, moderate, or severe)    - MILD (1-3): doesn't interfere with normal activities     - MODERATE (4-7): interferes with normal activities or awakens from sleep     - SEVERE (8-10): excruciating pain, unable to do any normal activities         3/10    5. RECURRENT SYMPTOM: \"Have you ever had headaches before? \" If so, ask: \"When was the last time? \" and \"What happened that time? \"       No    6. CAUSE: \"What do you think is causing the headache? \"      Feels like it could be a migraine    7. MIGRAINE: \"Have you been diagnosed with migraine headaches? \" If so, ask: \"Is this headache similar? \"       No    8. HEAD INJURY: \"Has there been any recent injury to the head? \"       No    9. OTHER SYMPTOMS: \"Do you have any other symptoms? \" (fever, stiff neck, eye pain, sore throat, cold symptoms)      Flickering vision, blurry vision, couldn't keep eyes open, no fever, cold symptoms a week ago but none now- thinks was allergies    10. PREGNANCY: \"Is there any chance you are pregnant? \" \"When was your last menstrual period? \"        No, on period    Protocols used: HEADACHE-ADULT-[place]    Received call from Feliberto Patrick at [facility] with Fever.     Brief description of triage: headache-worst of life with visual changes and dizziness    Triage indicates for patient to [address] or PCP with approval    Care advice provided, patient verbalizes understanding; denies any other questions or concerns; instructed to call back for any new or worsening symptoms. Writer provided warm transfer to [facility]. Attention Provider: Thank you for allowing me to participate in the care of your patient. The patient was connected to triage in response to information provided to the E[facility]. Please do not respond through this encounter as the response is not directed to a shared pool.

## 2021-09-20 ENCOUNTER — HOSPITAL ENCOUNTER (OUTPATIENT)
Age: 21
Discharge: HOME | End: 2021-09-20
Payer: COMMERCIAL

## 2021-09-20 LAB
IRON SATURATION: 22 % (ref 20–55)
IRON: 62 UG/DL (ref 37–145)
T3 FREE: 18.52 PG/ML (ref 2.02–4.43)
THYROXINE, FREE: 4.13 NG/DL (ref 0.93–1.7)
TOTAL IRON BINDING CAPACITY: 278 UG/DL (ref 250–450)
TSH SERPL DL<=0.05 MIU/L-ACNC: <0.01 MIU/L (ref 0.3–5)
UNSATURATED IRON BINDING CAPACITY: 216 UG/DL (ref 112–347)
VITAMIN D 25-HYDROXY: 44.4 NG/ML (ref 30–100)

## 2021-09-20 PROCEDURE — 36415 COLL VENOUS BLD VENIPUNCTURE: CPT

## 2021-09-20 PROCEDURE — 83550 IRON BINDING TEST: CPT

## 2021-09-20 PROCEDURE — 84443 ASSAY THYROID STIM HORMONE: CPT

## 2021-09-20 PROCEDURE — 83540 ASSAY OF IRON: CPT

## 2021-09-20 PROCEDURE — 84481 FREE ASSAY (FT-3): CPT

## 2021-09-20 PROCEDURE — 82306 VITAMIN D 25 HYDROXY: CPT

## 2021-09-20 PROCEDURE — 84439 ASSAY OF FREE THYROXINE: CPT

## 2021-10-07 ENCOUNTER — OFFICE VISIT (OUTPATIENT)
Dept: FAMILY MEDICINE CLINIC | Age: 21
End: 2021-10-07
Payer: COMMERCIAL

## 2021-10-07 VITALS
WEIGHT: 140 LBS | TEMPERATURE: 98.1 F | BODY MASS INDEX: 27.34 KG/M2 | RESPIRATION RATE: 14 BRPM | HEART RATE: 67 BPM | SYSTOLIC BLOOD PRESSURE: 111 MMHG | DIASTOLIC BLOOD PRESSURE: 75 MMHG | OXYGEN SATURATION: 98 %

## 2021-10-07 DIAGNOSIS — J02.9 SORE THROAT: ICD-10-CM

## 2021-10-07 DIAGNOSIS — J02.0 STREP THROAT: Primary | ICD-10-CM

## 2021-10-07 DIAGNOSIS — H61.23 BILATERAL IMPACTED CERUMEN: ICD-10-CM

## 2021-10-07 LAB — S PYO AG THROAT QL: POSITIVE

## 2021-10-07 PROCEDURE — 87880 STREP A ASSAY W/OPTIC: CPT | Performed by: NURSE PRACTITIONER

## 2021-10-07 PROCEDURE — 99213 OFFICE O/P EST LOW 20 MIN: CPT | Performed by: NURSE PRACTITIONER

## 2021-10-07 RX ORDER — AMOXICILLIN 500 MG/1
500 CAPSULE ORAL 2 TIMES DAILY
Qty: 20 CAPSULE | Refills: 0 | Status: SHIPPED | OUTPATIENT
Start: 2021-10-07 | End: 2021-10-17

## 2021-10-07 ASSESSMENT — ENCOUNTER SYMPTOMS
SHORTNESS OF BREATH: 0
COUGH: 0
SORE THROAT: 1

## 2021-10-07 NOTE — PROGRESS NOTES
[address] WALK-IN  [facility] [address] [place] [address]  Dept: [phone]  Dept Fax: [phone]    Date of Visit:  10/7/2021  Patient Name: Clint Montague   Patient :  2000     CHIEF COMPLAINT:     Clint Montague is a 19 y.o. female who presents today is c/o of Pharyngitis (x2 weeks. swollen tonsils and ear pain. R side worse than Left.)          REVIEW OF SYSTEM      Review of Systems   Constitutional: Negative for chills, fatigue and fever. HENT: Positive for sore throat. Respiratory: Negative for cough and shortness of breath. All other systems reviewed and are negative. HISTORY OF PRESENT ILLNESS     Jim Hope presents to the walk-in clinic with c/o sore throat for the past two weeks. She states she has no associated symptoms of congestion, cough, fever, runny nose. She states it is painful, she has not done anything for the symptoms and they are worsening. She denies further needs or concerns. REVIEWED INFORMATION      Allergies   Allergen Reactions    Latex     Tapazole [Methimazole] Hives       Patient Active Problem List   Diagnosis    Hyperthyroidism    Graves disease    Migraine with aura and without status migrainosus, not intractable    Strep throat    Sore throat       Past Medical History:   Diagnosis Date    IUD (intrauterine device) in place     Thyroid disease 10/2018    Graves Disease       Past Surgical History:   Procedure Laterality Date    BREAST ENHANCEMENT SURGERY  2016    INTRAUTERINE DEVICE INSERTION  2016    Sheila Tobin    TYMPANOSTOMY TUBE PLACEMENT      WISDOM TOOTH EXTRACTION              PHYSICAL EXAM     /75   Pulse 67   Temp 98.1 °F (36.7 °C) (Temporal)   Resp 14   Wt 140 lb (63.5 kg)   SpO2 98%   BMI 27.34 kg/m²        Physical Exam  Vitals reviewed. Constitutional:       Appearance: Normal appearance. HENT:      Right Ear: There is impacted cerumen.       Left Ear: There is impacted cerumen. Nose: No congestion or rhinorrhea. Right Turbinates: Not enlarged, swollen or pale. Left Turbinates: Not enlarged, swollen or pale. Right Sinus: No frontal sinus tenderness. Left Sinus: No maxillary sinus tenderness or frontal sinus tenderness. Mouth/Throat:      Mouth: Mucous membranes are moist.      Pharynx: Oropharyngeal exudate and posterior oropharyngeal erythema present. Tonsils: Tonsillar exudate and tonsillar abscess present. 2+ on the right. Cardiovascular:      Rate and Rhythm: Regular rhythm. Pulses: Normal pulses. Pulmonary:      Breath sounds: Normal breath sounds. Abdominal:      Palpations: Abdomen is soft. Lymphadenopathy:      Head:      Right side of head: Submandibular and tonsillar adenopathy present. Left side of head: Submandibular and tonsillar adenopathy present. Cervical: No cervical adenopathy. Skin:     General: Skin is warm and dry. Neurological:      Mental Status: She is alert. Results for orders placed or performed in visit on 10/07/21   POCT rapid strep A   Result Value Ref Range    Strep A Ag Positive (A) None Detected         ASSESSMENT/PLAN     Based on exam and results of POCT Rapid strep positive, will treat with Amoxicillin 500 mg PO BID x 10 days. Instructed to make sure she completes the antibiotic. Instructions to manage symptoms  printed with after-visit summary     Cerumen impaction: Soft cerumen in bilateral ear canals, offered cerumen removal and patient declined. Instructed to purchase an over the counter ear wax removal kit and follow directions on package. She voiced understanding. Patient counseled:     Patient given educational materials - see patient instructions. Discussed use, benefit, and side effects of prescribed medications. All patient questions answered. Pt verbalized understanding. Instructed to continue current medications, diet and exercise.   Patient

## 2021-10-07 NOTE — PATIENT INSTRUCTIONS

## 2021-10-18 ENCOUNTER — HOSPITAL ENCOUNTER (OUTPATIENT)
Age: 21
Discharge: HOME | End: 2021-10-18
Payer: COMMERCIAL

## 2021-10-18 LAB
T3 FREE: 8.69 PG/ML (ref 2.02–4.43)
THYROXINE, FREE: 2.11 NG/DL (ref 0.93–1.7)
TSH SERPL DL<=0.05 MIU/L-ACNC: <0.01 MIU/L (ref 0.3–5)

## 2021-10-18 PROCEDURE — 36415 COLL VENOUS BLD VENIPUNCTURE: CPT

## 2021-10-18 PROCEDURE — 84481 FREE ASSAY (FT-3): CPT

## 2021-10-18 PROCEDURE — 84439 ASSAY OF FREE THYROXINE: CPT

## 2021-10-18 PROCEDURE — 84443 ASSAY THYROID STIM HORMONE: CPT

## 2021-10-25 DIAGNOSIS — G43.109 MIGRAINE WITH AURA AND WITHOUT STATUS MIGRAINOSUS, NOT INTRACTABLE: Primary | ICD-10-CM

## 2021-10-25 RX ORDER — SUMATRIPTAN 50 MG/1
50 TABLET, FILM COATED ORAL DAILY PRN
Qty: 30 TABLET | Refills: 0 | Status: SHIPPED | OUTPATIENT
Start: 2021-10-25 | End: 2022-11-04

## 2021-10-25 RX ORDER — PREDNISONE 20 MG/1
20 TABLET ORAL DAILY
Qty: 5 TABLET | Refills: 0 | Status: SHIPPED | OUTPATIENT
Start: 2021-10-25 | End: 2021-10-30

## 2021-11-06 PROBLEM — J02.9 SORE THROAT: Status: RESOLVED | Noted: 2021-10-07 | Resolved: 2021-11-06

## 2021-11-19 ENCOUNTER — HOSPITAL ENCOUNTER (OUTPATIENT)
Age: 21
Discharge: HOME | End: 2021-11-19
Payer: COMMERCIAL

## 2021-11-19 LAB
ALT SERPL-CCNC: 10 U/L (ref 5–33)
T3 FREE: 6.94 PG/ML (ref 2.02–4.43)
THYROXINE, FREE: 1.4 NG/DL (ref 0.93–1.7)
TSH SERPL DL<=0.05 MIU/L-ACNC: <0.01 MIU/L (ref 0.3–5)

## 2021-11-19 PROCEDURE — 84460 ALANINE AMINO (ALT) (SGPT): CPT

## 2021-11-19 PROCEDURE — 84481 FREE ASSAY (FT-3): CPT

## 2021-11-19 PROCEDURE — 84439 ASSAY OF FREE THYROXINE: CPT

## 2021-11-19 PROCEDURE — 84443 ASSAY THYROID STIM HORMONE: CPT

## 2021-11-19 PROCEDURE — 36415 COLL VENOUS BLD VENIPUNCTURE: CPT

## 2022-01-24 ENCOUNTER — HOSPITAL ENCOUNTER (OUTPATIENT)
Age: 22
Discharge: HOME | End: 2022-01-24
Payer: COMMERCIAL

## 2022-01-24 LAB
ALT SERPL-CCNC: 12 U/L (ref 5–33)
T3 FREE: 3.55 PG/ML (ref 2.02–4.43)
THYROXINE, FREE: 1.09 NG/DL (ref 0.93–1.7)
TSH SERPL DL<=0.05 MIU/L-ACNC: <0.01 MIU/L (ref 0.3–5)

## 2022-01-24 PROCEDURE — 84439 ASSAY OF FREE THYROXINE: CPT

## 2022-01-24 PROCEDURE — 36415 COLL VENOUS BLD VENIPUNCTURE: CPT

## 2022-01-24 PROCEDURE — 84443 ASSAY THYROID STIM HORMONE: CPT

## 2022-01-24 PROCEDURE — 84481 FREE ASSAY (FT-3): CPT

## 2022-01-24 PROCEDURE — 84460 ALANINE AMINO (ALT) (SGPT): CPT

## 2022-03-28 ENCOUNTER — HOSPITAL ENCOUNTER (OUTPATIENT)
Age: 22
Discharge: HOME | End: 2022-03-28
Payer: COMMERCIAL

## 2022-03-28 LAB
ALT SERPL-CCNC: 10 U/L (ref 5–33)
T3 FREE: 2.13 PG/ML (ref 2.02–4.43)
THYROXINE, FREE: 0.77 NG/DL (ref 0.93–1.7)
TSH SERPL DL<=0.05 MIU/L-ACNC: 0.02 MIU/L (ref 0.3–5)

## 2022-03-28 PROCEDURE — 84460 ALANINE AMINO (ALT) (SGPT): CPT

## 2022-03-28 PROCEDURE — 84439 ASSAY OF FREE THYROXINE: CPT

## 2022-03-28 PROCEDURE — 84481 FREE ASSAY (FT-3): CPT

## 2022-03-28 PROCEDURE — 36415 COLL VENOUS BLD VENIPUNCTURE: CPT

## 2022-03-28 PROCEDURE — 84443 ASSAY THYROID STIM HORMONE: CPT

## 2022-05-27 ENCOUNTER — HOSPITAL ENCOUNTER (OUTPATIENT)
Age: 22
Discharge: HOME | End: 2022-05-27
Payer: COMMERCIAL

## 2022-05-27 ENCOUNTER — HOSPITAL ENCOUNTER (OUTPATIENT)
Age: 22
End: 2022-05-27
Payer: COMMERCIAL

## 2022-05-27 LAB
ALT SERPL-CCNC: 12 U/L (ref 5–33)
HCT VFR BLD CALC: 41 % (ref 36.3–47.1)
HEMOGLOBIN: 13.2 G/DL (ref 11.9–15.1)
MCH RBC QN AUTO: 30.5 PG (ref 25.2–33.5)
MCHC RBC AUTO-ENTMCNC: 32.2 G/DL (ref 28.4–34.8)
MCV RBC AUTO: 94.7 FL (ref 82.6–102.9)
NRBC AUTOMATED: 0 PER 100 WBC
PDW BLD-RTO: 12.2 % (ref 11.8–14.4)
PLATELET # BLD: 310 K/UL (ref 138–453)
PMV BLD AUTO: 10.1 FL (ref 8.1–13.5)
RBC # BLD: 4.33 M/UL (ref 3.95–5.11)
T3 FREE: 2.63 PG/ML (ref 2.02–4.43)
THYROXINE, FREE: 1.01 NG/DL (ref 0.93–1.7)
TSH SERPL DL<=0.05 MIU/L-ACNC: 0.24 UIU/ML (ref 0.3–5)
WBC # BLD: 7.2 K/UL (ref 3.5–11.3)

## 2022-05-27 PROCEDURE — 84439 ASSAY OF FREE THYROXINE: CPT

## 2022-05-27 PROCEDURE — 84443 ASSAY THYROID STIM HORMONE: CPT

## 2022-05-27 PROCEDURE — 85027 COMPLETE CBC AUTOMATED: CPT

## 2022-05-27 PROCEDURE — 84460 ALANINE AMINO (ALT) (SGPT): CPT

## 2022-05-27 PROCEDURE — 36415 COLL VENOUS BLD VENIPUNCTURE: CPT

## 2022-05-27 PROCEDURE — 84481 FREE ASSAY (FT-3): CPT

## 2022-07-18 ENCOUNTER — HOSPITAL ENCOUNTER (OUTPATIENT)
Age: 22
Discharge: HOME | End: 2022-07-18
Payer: COMMERCIAL

## 2022-07-18 LAB
T3 FREE: 3.47 PG/ML (ref 2.02–4.43)
THYROXINE, FREE: 1.16 NG/DL (ref 0.93–1.7)
TSH SERPL DL<=0.05 MIU/L-ACNC: 0.02 UIU/ML (ref 0.3–5)

## 2022-07-18 PROCEDURE — 84439 ASSAY OF FREE THYROXINE: CPT

## 2022-07-18 PROCEDURE — 84443 ASSAY THYROID STIM HORMONE: CPT

## 2022-07-18 PROCEDURE — 84481 FREE ASSAY (FT-3): CPT

## 2022-07-18 PROCEDURE — 36415 COLL VENOUS BLD VENIPUNCTURE: CPT

## 2022-07-21 ENCOUNTER — TELEPHONE (OUTPATIENT)
Dept: OBGYN CLINIC | Age: 22
End: 2022-07-21

## 2022-07-27 ENCOUNTER — OFFICE VISIT (OUTPATIENT)
Dept: OBGYN CLINIC | Age: 22
End: 2022-07-27
Payer: COMMERCIAL

## 2022-07-27 VITALS — BODY MASS INDEX: 28.32 KG/M2 | SYSTOLIC BLOOD PRESSURE: 118 MMHG | WEIGHT: 145 LBS | DIASTOLIC BLOOD PRESSURE: 62 MMHG

## 2022-07-27 DIAGNOSIS — T83.32XA MALPOSITIONED INTRAUTERINE DEVICE (IUD), INITIAL ENCOUNTER: ICD-10-CM

## 2022-07-27 DIAGNOSIS — N93.9 VAGINAL BLEEDING: Primary | ICD-10-CM

## 2022-07-27 DIAGNOSIS — N92.6 IRREGULAR MENSTRUAL BLEEDING: ICD-10-CM

## 2022-07-27 PROCEDURE — 58301 REMOVE INTRAUTERINE DEVICE: CPT | Performed by: NURSE PRACTITIONER

## 2022-07-27 PROCEDURE — 99214 OFFICE O/P EST MOD 30 MIN: CPT | Performed by: NURSE PRACTITIONER

## 2022-07-27 RX ORDER — DROSPIRENONE 4 MG/1
1 TABLET, FILM COATED ORAL DAILY
Qty: 84 TABLET | Refills: 0 | Status: SHIPPED | OUTPATIENT
Start: 2022-07-27 | End: 2022-11-04

## 2022-07-27 ASSESSMENT — ENCOUNTER SYMPTOMS
VOMITING: 0
SHORTNESS OF BREATH: 0
WHEEZING: 0
COLOR CHANGE: 0
NAUSEA: 0

## 2022-07-27 NOTE — PROGRESS NOTES
SUBJECTIVE:            CC:   Chief Complaint   Patient presents with    Vaginal Bleeding     IUD malpositioned        HPI:  The patient is requesting that her IUD be removed as malpositioned. The patient was counseled on the procedure. Risks, benefits and alternatives were reviewed. Consent for removal was obtained. Objective:  Blood pressure 118/62, weight 145 lb (65.8 kg), not currently breastfeeding. No LMP recorded. Patient has had an implant. The patient was counseled on the procedure. Risks, benefits and alternatives were reviewed. Consent for removal was obtained. The patient was positioned comfortably on the exam table. There was no cervical motion tenderness or adnexal masses. The bladder was smooth, non-tender and without palpable masses. A sterile speculum was placed without incident and the string was identified at the cervical portio. The strings were grasped with a ring forcep and the IUD was removed without incident. Assessment:  IUD removal   Malpositioned IUD     Plan:  1) Post procedure restrictions were reviewed and given to the patient. 2)She was given a prescription for POP        Patient was seen with total face to face time of 30 minutes. More than 50% of this visit was on counseling and education regarding her future pregnancy,  recommendations and her interval family planning options. She was also counseled on her preventative health maintenance recommendations and follow-up.

## 2022-07-27 NOTE — PROGRESS NOTES
[facility],[address] OB/GYN [place]  [address] [address]  Dept: [phone]  Dept Fax: [phone]    Vita Rich is a 23 y.o. female who presents today for her medical conditions/complaintsas noted below. Vita Rich is c/o of Vaginal Bleeding (IUD malpositioned )        HPI:     HPI  Pt is here with complaints of abnormal vaginal bleeding she states on 7/21/22 she was at end of what she thought was her normal period, she has had paraguard IUD since 2016 and has regular periods on it, but on that day had heavy vaginal bleeding with clots that she has never experienced before- she bleed through super plus tampon and max pad- had clots. She denies significant pelvic pain vaginal d/c odor or itching with this denies trauma prior ot intercourse prior. Denie snay bleeding since or severe pelvic pain. No UTI sx, no pelvic pain, fevers, chills, nausea/vomiting. No recent antibiotics, changes in soaps. No change in partners  LMP- 1 week ago    She had pelvic US today and revealed malpositioned IUD- she is concerned about other hormonal contraceptives had issues with [facility]- ocp in high school causing mood changes and weight gain. HA shx migraines she states related to her thyroid issues.            Past Medical History:   Diagnosis Date    IUD (intrauterine device) in place     Thyroid disease 10/2018    Graves Disease      Past Surgical History:   Procedure Laterality Date    BREAST ENHANCEMENT SURGERY  2016    INTRAUTERINE DEVICE INSERTION  08/2016    Paragaalden Brumfield    TYMPANOSTOMY TUBE PLACEMENT      WISDOM TOOTH EXTRACTION         Family History   Problem Relation Age of Onset    Other Father         Autoimmune disease    Thyroid Disease Father     Asthma Brother        Social History     Tobacco Use    Smoking status: Never    Smokeless tobacco: Never   Substance Use Topics    Alcohol use: Yes     Comment: 1-2 drinks weekly Current Outpatient Medications   Medication Sig Dispense Refill    Drospirenone (SLYND) 4 MG TABS Take 1 tablet by mouth daily 84 tablet 0    SUMAtriptan (IMITREX) 50 MG tablet Take 1 tablet by mouth daily as needed for Migraine 30 tablet 0    butalbital-acetaminophen-caffeine (FIORICET, ESGIC) -40 MG per tablet Take 1 tablet by mouth every 4 hours as needed for Headaches 60 tablet 0     No current facility-administered medications for this visit. Allergies   Allergen Reactions    Latex     Tapazole [Methimazole] Hives       Health Maintenance   Topic Date Due    Hepatitis A vaccine (2 of 2 - 2-dose series) 11/04/2010    HIV screen  Never done    HPV vaccine (3 - 3-dose series) 12/19/2014    Hepatitis C screen  Never done    DTaP/Tdap/Td vaccine (7 - Td or Tdap) 05/04/2020    Chlamydia screen  05/11/2021    COVID-19 Vaccine (3 - Booster for Moderna series) 09/21/2021    Depression Screen  08/12/2022    Flu vaccine (1) 09/01/2022    Pap smear  05/11/2023    Hepatitis B vaccine  Completed    Hib vaccine  Completed    Varicella vaccine  Completed    Meningococcal (ACWY) vaccine  Completed    Pneumococcal 0-64 years Vaccine  Aged Out       Subjective:     Review of Systems   Constitutional:  Negative for chills and fever. Respiratory:  Negative for shortness of breath and wheezing. Cardiovascular:  Negative for chest pain, palpitations and leg swelling. Gastrointestinal:  Negative for nausea and vomiting. Genitourinary:  Positive for menstrual problem and vaginal bleeding. Negative for dyspareunia, dysuria, pelvic pain, urgency, vaginal discharge and vaginal pain. Skin:  Negative for color change and pallor. Neurological:  Positive for headaches (hx migraines). Negative for dizziness. Hematological:  Negative for adenopathy. Does not bruise/bleed easily. Psychiatric/Behavioral:  Negative for self-injury and suicidal ideas. The patient is nervous/anxious.       Objective:     Physical Exam  Vitals and nursing note reviewed. Constitutional:       General: She is not in acute distress. Appearance: She is well-developed. She is not diaphoretic. HENT:      Head: Normocephalic and atraumatic. Right Ear: External ear normal.      Left Ear: External ear normal.      Nose: Nose normal.   Eyes:      Pupils: Pupils are equal, round, and reactive to light. Cardiovascular:      Rate and Rhythm: Normal rate and regular rhythm. Heart sounds: Normal heart sounds. No murmur heard. No friction rub. No gallop. Pulmonary:      Effort: Pulmonary effort is normal.      Breath sounds: Normal breath sounds. No wheezing. Abdominal:      General: Bowel sounds are normal.      Palpations: Abdomen is soft. Abdomen is not rigid. Tenderness: There is no abdominal tenderness. There is no guarding or rebound. Hernia: There is no hernia in the left inguinal area. Genitourinary:     Labia:         Right: No rash, tenderness, lesion or injury. Left: No rash, tenderness, lesion or injury. Vagina: Normal. No signs of injury and foreign body. No vaginal discharge, erythema, tenderness or bleeding. Cervix: No cervical motion tenderness, discharge or friability. Uterus: Not enlarged and not tender. Adnexa:         Right: No mass, tenderness or fullness. Left: No mass, tenderness or fullness. Comments: Cervix- IUD Strings visible  Musculoskeletal:         General: Normal range of motion. Cervical back: Normal range of motion and neck supple. Skin:     General: Skin is warm and dry. Findings: No rash. Neurological:      Mental Status: She is alert and oriented to person, place, and time. Cranial Nerves: No cranial nerve deficit. Psychiatric:         Behavior: Behavior normal.         Thought Content:  Thought content normal.         Judgment: Judgment normal.     /62 (Site: Right Upper Arm, Position: Sitting, Cuff Size: Medium Adult)   Wt 145 lb (65.8 kg)   BMI 28.32 kg/m²     Assessment:       Diagnosis Orders   1. Vaginal bleeding        2. Malpositioned intrauterine device (IUD), initial encounter  [place] REMOVE INTRAUTERINE DEVICE      3. Irregular menstrual bleeding            Pelvic exam completed    Plan:      Vaginal bleeding-    Reviewed Pelvic US malpositioned IUD. Reviewed alternate hormonal contraception with pt. She is interested in POP at this time but may consider kyleena IUD next month. She states will let us know but will take POP slynd sample today, after IUD removal to start       No CMT tenderness or pelvic tenderness with exam, denies consitutional symptoms. Monitor for fevers, chills, n/v, abdominal/pelvic let us know if occurs. Encouraged  safe sex practices. Return in about 4 weeks (around 8/24/2022) for follow up on BC/bleeding. Orders Placed This Encounter   Procedures    [place] REMOVE INTRAUTERINE DEVICE     Orders Placed This Encounter   Medications    Drospirenone (SLYND) 4 MG TABS     Sig: Take 1 tablet by mouth daily     Dispense:  84 tablet     Refill:  0         Patient given educational materials - seepatient instructions. Discussed use, benefit, and side effects of prescribed medications. All patient questions answered. Pt voiced understanding. Reviewed health maintenance. Instructed to continue current medications, diet and exercise. Patient agreedwith treatment plan. Follow up as directed. Electronically signed by АНДРЕЙ Gerardo CNP on 7/27/2022at 7:16 PM      Of the 30 minute duration appointment visit, Michele Wayne CNP spent at least 50% of the face-to-face time in counseling, explanation of diagnosis, planning of further management, and answering all questions.

## 2022-09-16 ENCOUNTER — HOSPITAL ENCOUNTER (OUTPATIENT)
Age: 22
Discharge: HOME | End: 2022-09-16
Payer: COMMERCIAL

## 2022-09-16 LAB
T3 FREE: 2.55 PG/ML (ref 2.02–4.43)
THYROXINE, FREE: 0.94 NG/DL (ref 0.93–1.7)
TSH SERPL DL<=0.05 MIU/L-ACNC: 0.12 UIU/ML (ref 0.3–5)

## 2022-09-16 PROCEDURE — 84443 ASSAY THYROID STIM HORMONE: CPT

## 2022-09-16 PROCEDURE — 84439 ASSAY OF FREE THYROXINE: CPT

## 2022-09-16 PROCEDURE — 84481 FREE ASSAY (FT-3): CPT

## 2022-09-16 PROCEDURE — 36415 COLL VENOUS BLD VENIPUNCTURE: CPT

## 2022-11-04 ENCOUNTER — HOSPITAL ENCOUNTER (OUTPATIENT)
Age: 22
Discharge: HOME | End: 2022-11-06
Payer: COMMERCIAL

## 2022-11-04 ENCOUNTER — OFFICE VISIT (OUTPATIENT)
Dept: PRIMARY CARE CLINIC | Age: 22
End: 2022-11-04
Payer: COMMERCIAL

## 2022-11-04 ENCOUNTER — HOSPITAL ENCOUNTER (OUTPATIENT)
Dept: GENERAL RADIOLOGY | Age: 22
Discharge: HOME | End: 2022-11-06
Payer: COMMERCIAL

## 2022-11-04 VITALS
DIASTOLIC BLOOD PRESSURE: 72 MMHG | HEART RATE: 89 BPM | WEIGHT: 142 LBS | BODY MASS INDEX: 27.73 KG/M2 | OXYGEN SATURATION: 95 % | SYSTOLIC BLOOD PRESSURE: 124 MMHG

## 2022-11-04 DIAGNOSIS — M25.441 FINGER JOINT SWELLING, RIGHT: Primary | ICD-10-CM

## 2022-11-04 DIAGNOSIS — M25.441 FINGER JOINT SWELLING, RIGHT: ICD-10-CM

## 2022-11-04 DIAGNOSIS — M79.644 FINGER PAIN, RIGHT: ICD-10-CM

## 2022-11-04 PROCEDURE — 73140 X-RAY EXAM OF FINGER(S): CPT

## 2022-11-04 PROCEDURE — 99214 OFFICE O/P EST MOD 30 MIN: CPT | Performed by: NURSE PRACTITIONER

## 2022-11-04 RX ORDER — PROPYLTHIOURACIL 50 MG/1
TABLET ORAL
COMMUNITY
Start: 2018-10-03

## 2022-11-04 RX ORDER — PREDNISONE 20 MG/1
20 TABLET ORAL DAILY
Qty: 5 TABLET | Refills: 0 | Status: SHIPPED | OUTPATIENT
Start: 2022-11-04 | End: 2022-11-09

## 2022-11-04 SDOH — ECONOMIC STABILITY: FOOD INSECURITY: WITHIN THE PAST 12 MONTHS, THE FOOD YOU BOUGHT JUST DIDN'T LAST AND YOU DIDN'T HAVE MONEY TO GET MORE.: NEVER TRUE

## 2022-11-04 SDOH — ECONOMIC STABILITY: FOOD INSECURITY: WITHIN THE PAST 12 MONTHS, YOU WORRIED THAT YOUR FOOD WOULD RUN OUT BEFORE YOU GOT MONEY TO BUY MORE.: NEVER TRUE

## 2022-11-04 ASSESSMENT — ENCOUNTER SYMPTOMS
SORE THROAT: 0
COLOR CHANGE: 0
CHEST TIGHTNESS: 0
ABDOMINAL PAIN: 0
VOMITING: 0
DIARRHEA: 0
RHINORRHEA: 0
SHORTNESS OF BREATH: 0
NAUSEA: 0

## 2022-11-04 ASSESSMENT — PATIENT HEALTH QUESTIONNAIRE - PHQ9
SUM OF ALL RESPONSES TO PHQ QUESTIONS 1-9: 0
SUM OF ALL RESPONSES TO PHQ9 QUESTIONS 1 & 2: 0
1. LITTLE INTEREST OR PLEASURE IN DOING THINGS: 0
2. FEELING DOWN, DEPRESSED OR HOPELESS: 0
SUM OF ALL RESPONSES TO PHQ QUESTIONS 1-9: 0

## 2022-11-04 ASSESSMENT — SOCIAL DETERMINANTS OF HEALTH (SDOH): HOW HARD IS IT FOR YOU TO PAY FOR THE VERY BASICS LIKE FOOD, HOUSING, MEDICAL CARE, AND HEATING?: NOT HARD AT ALL

## 2022-11-04 NOTE — PROGRESS NOTES
[address] [facility]IMARY [facility]  [facility] [address]  [address] [address]  Dept: [phone]  Dept Fax: [phone]    Louisa Sigala is a 23 y.o. female who presentstoday for her medical conditions/complaints as noted below. Louisa Sigala is c/o of  Chief Complaint   Patient presents with    Joint Pain     R 2nd digit pain and swelling off and on x7 mo.  Denies injury, no discoloration          HPI:     Here with complaint of chronic right index finger since March 2022 since her puppy bit her finger  She is established pt of Dr. Cruz Michel   She felt it was broken at that time but did not have radiographs  Does note that pain and swelling did improve but does return intermittently  Had severe pain yesterday and limited range of motion in right index finger, this is improved today  Has had some improvement with over-the-counter ibuprofen and Tylenol  Denies any other joint swelling, redness or pain      Hemoglobin A1C (%)   Date Value   08/22/2015 4.9             ( goal A1C is < 7)   No results found for: LABMICR  LDL Cholesterol (mg/dL)   Date Value   08/22/2015 136 (H)       (goal LDL is <100)   AST (U/L)   Date Value   09/11/2018 14     ALT (U/L)   Date Value   05/27/2022 12     BUN (mg/dL)   Date Value   09/11/2018 13     BP Readings from Last 3 Encounters:   11/04/22 124/72   07/27/22 118/62   10/07/21 111/75          ([id number])    Past Medical History:   Diagnosis Date    IUD (intrauterine device) in place     Thyroid disease 10/2018    Graves Disease      Past Surgical History:   Procedure Laterality Date    BREAST ENHANCEMENT SURGERY  2016    INTRAUTERINE DEVICE INSERTION  08/2016    Sheila Bates Certain    TYMPANOSTOMY TUBE PLACEMENT      WISDOM TOOTH EXTRACTION         Family History   Problem Relation Age of Onset    Other Father         Autoimmune disease    Thyroid Disease Father     Asthma Brother        Social History     Tobacco Use    Smoking status: Never Smokeless tobacco: Never   Substance Use Topics    Alcohol use: Yes     Comment: 1-2 drinks weekly      Current Outpatient Medications   Medication Sig Dispense Refill    propylthiouracil (PTU) 50 MG tablet       predniSONE (DELTASONE) 20 MG tablet Take 1 tablet by mouth daily for 5 days 5 tablet 0     No current facility-administered medications for this visit. Allergies   Allergen Reactions    Latex     Tapazole [Methimazole] Hives       Health Maintenance   Topic Date Due    Hepatitis A vaccine (2 of 2 - 2-dose series) 11/04/2010    HIV screen  Never done    HPV vaccine (3 - 3-dose series) 12/19/2014    Hepatitis C screen  Never done    DTaP/Tdap/Td vaccine (7 - Td or Tdap) 05/04/2020    Chlamydia/GC screen  05/11/2021    COVID-19 Vaccine (3 - Booster for Moderna series) 06/16/2021    Flu vaccine (1) 08/01/2022    Depression Screen  08/12/2022    Pap smear  05/11/2023    Hib vaccine  Completed    Varicella vaccine  Completed    Meningococcal (ACWY) vaccine  Completed    Pneumococcal 0-64 years Vaccine  Aged Out       Subjective:      Review of Systems   Constitutional:  Negative for activity change, fatigue and fever. HENT:  Negative for congestion, rhinorrhea and sore throat. Eyes:  Negative for visual disturbance. Respiratory:  Negative for chest tightness and shortness of breath. Cardiovascular:  Negative for chest pain and palpitations. Gastrointestinal:  Negative for abdominal pain, diarrhea, nausea and vomiting. Endocrine: Negative for polydipsia. Genitourinary:  Negative for difficulty urinating. Musculoskeletal:  Positive for joint swelling (R index finger). Negative for arthralgias and myalgias. Skin:  Negative for color change. Neurological:  Negative for weakness and headaches. Psychiatric/Behavioral:  Negative for behavioral problems. The patient is not nervous/anxious. All other systems reviewed and are negative.     Objective:   /72   Pulse 89   Wt 142 lb (64.4 kg)   SpO2 95%   BMI 27.73 kg/m²   Physical Exam  Vitals reviewed. Constitutional:       General: She is not in acute distress. Appearance: Normal appearance. HENT:      Head: Normocephalic. Eyes:      Pupils: Pupils are equal, round, and reactive to light. Cardiovascular:      Rate and Rhythm: Normal rate and regular rhythm. Pulses: Normal pulses. Heart sounds: Normal heart sounds. Pulmonary:      Effort: Pulmonary effort is normal.      Breath sounds: Normal breath sounds. Abdominal:      General: There is no distension. Musculoskeletal:         General: Normal range of motion. Right hand: Tenderness (R index finger) present. No swelling. Normal range of motion. Cervical back: Neck supple. Right lower leg: No edema. Left lower leg: No edema. Lymphadenopathy:      Cervical: No cervical adenopathy. Skin:     General: Skin is warm and dry. Capillary Refill: Capillary refill takes less than 2 seconds. Neurological:      General: No focal deficit present. Mental Status: She is alert and oriented to person, place, and time. Psychiatric:         Mood and Affect: Mood normal.         Behavior: Behavior normal.         :       Diagnosis Orders   1. Finger joint swelling, right  XR FINGER RIGHT (MIN 2 VIEWS)    predniSONE (DELTASONE) 20 MG tablet      2. Finger pain, right  XR FINGER RIGHT (MIN 2 VIEWS)    predniSONE (DELTASONE) 20 MG tablet                :          1. Finger joint swelling, right  2. Finger pain, right  Worsening, rx for prednisone and XR. Home exercises on AVS. F/u PRN for persistent or worsening symptoms. Continue topical medication as needed & heat/ice. Discussed buddy taping to middle finger to help immobilize. Does work on computer and types most of day, rest may help. - XR FINGER RIGHT (MIN 2 VIEWS); Future  - predniSONE (DELTASONE) 20 MG tablet; Take 1 tablet by mouth daily for 5 days  Dispense: 5 tablet;  Refill: 0    Return if symptoms worsen or fail to improve. Patient given educational materials - see patient instructions. Discussed use, benefit, and side effects of prescribed medications. All patient questions answered. Pt voiced understanding. Reviewed health maintenance. Instructed to continue current medications, diet and exercise. Patient agreed with treatment plan. Follow up as directed.        Electronicallysigned by АНДРЕЙ Knox CNP on 11/4/2022 at 2:30 PM

## 2022-11-27 ENCOUNTER — HOSPITAL ENCOUNTER (OUTPATIENT)
Age: 22
Discharge: HOME | End: 2022-11-27
Payer: COMMERCIAL

## 2022-11-27 LAB
T3 FREE: 4.32 PG/ML (ref 2.02–4.43)
THYROXINE, FREE: 1.32 NG/DL (ref 0.93–1.7)
TSH SERPL DL<=0.05 MIU/L-ACNC: <0.01 UIU/ML (ref 0.3–5)

## 2022-11-27 PROCEDURE — 84481 FREE ASSAY (FT-3): CPT

## 2022-11-27 PROCEDURE — 36415 COLL VENOUS BLD VENIPUNCTURE: CPT

## 2022-11-27 PROCEDURE — 84439 ASSAY OF FREE THYROXINE: CPT

## 2022-11-27 PROCEDURE — 84443 ASSAY THYROID STIM HORMONE: CPT

## 2022-11-28 ENCOUNTER — OFFICE VISIT (OUTPATIENT)
Dept: FAMILY MEDICINE CLINIC | Age: 22
End: 2022-11-28
Payer: COMMERCIAL

## 2022-11-28 VITALS
TEMPERATURE: 98.1 F | SYSTOLIC BLOOD PRESSURE: 103 MMHG | OXYGEN SATURATION: 97 % | DIASTOLIC BLOOD PRESSURE: 62 MMHG | BODY MASS INDEX: 27.48 KG/M2 | HEART RATE: 87 BPM | WEIGHT: 140 LBS | HEIGHT: 60 IN

## 2022-11-28 DIAGNOSIS — Z23 NEED FOR TDAP VACCINATION: ICD-10-CM

## 2022-11-28 DIAGNOSIS — H61.22 IMPACTED CERUMEN OF LEFT EAR: Primary | ICD-10-CM

## 2022-11-28 DIAGNOSIS — H72.92 RUPTURED TYMPANIC MEMBRANE, LEFT: ICD-10-CM

## 2022-11-28 PROCEDURE — 99213 OFFICE O/P EST LOW 20 MIN: CPT | Performed by: REGISTERED NURSE

## 2022-11-28 PROCEDURE — 90715 TDAP VACCINE 7 YRS/> IM: CPT | Performed by: REGISTERED NURSE

## 2022-11-28 PROCEDURE — 90471 IMMUNIZATION ADMIN: CPT | Performed by: REGISTERED NURSE

## 2022-11-28 RX ORDER — OFLOXACIN 3 MG/ML
10 SOLUTION AURICULAR (OTIC) DAILY
Qty: 3.5 ML | Refills: 0 | Status: SHIPPED | OUTPATIENT
Start: 2022-11-28 | End: 2022-12-05

## 2022-11-28 RX ORDER — AMOXICILLIN AND CLAVULANATE POTASSIUM 875; 125 MG/1; MG/1
1 TABLET, FILM COATED ORAL 2 TIMES DAILY
Qty: 14 TABLET | Refills: 0 | Status: SHIPPED | OUTPATIENT
Start: 2022-11-28 | End: 2022-12-05

## 2022-11-28 ASSESSMENT — ENCOUNTER SYMPTOMS
WHEEZING: 0
COUGH: 0
VOICE CHANGE: 0
EYE PAIN: 0
EYE DISCHARGE: 0
SHORTNESS OF BREATH: 0
TROUBLE SWALLOWING: 0
SINUS PAIN: 0
EYE ITCHING: 0
RESPIRATORY NEGATIVE: 1
SINUS PRESSURE: 0
GASTROINTESTINAL NEGATIVE: 1
RHINORRHEA: 0

## 2022-11-28 NOTE — PROGRESS NOTES
left ear daily for 7 days Yes Mayte Rivero APRN - CNP   amoxicillin-clavulanate (AUGMENTIN) 875-125 MG per tablet Take 1 tablet by mouth 2 times daily for 7 days Yes АНДРЕЙ Garzon - NIRAJ   propylthiouracil (PTU) 50 MG tablet   Historical Provider, MD       Allergies   Allergen Reactions    Latex     Tapazole [Methimazole] Hives         Subjective:      Review of Systems   Constitutional:  Negative for chills, fatigue and fever. HENT:  Positive for hearing loss. Negative for congestion, ear discharge, ear pain, rhinorrhea, sinus pressure, sinus pain, trouble swallowing and voice change. Eyes:  Negative for pain, discharge and itching. Respiratory: Negative. Negative for cough, shortness of breath and wheezing. Cardiovascular:  Negative for chest pain and palpitations. Gastrointestinal: Negative. Genitourinary: Negative. Musculoskeletal: Negative. Neurological: Negative. Psychiatric/Behavioral: Negative. Objective:     Physical Exam  Constitutional:       General: She is not in acute distress. Appearance: Normal appearance. She is normal weight. She is not ill-appearing, toxic-appearing or diaphoretic. HENT:      Right Ear: Ear canal and external ear normal. There is no impacted cerumen. Left Ear: External ear normal. There is impacted cerumen. Nose: Nose normal.      Mouth/Throat:      Mouth: Mucous membranes are moist.      Pharynx: Oropharynx is clear. No oropharyngeal exudate or posterior oropharyngeal erythema. Eyes:      Conjunctiva/sclera: Conjunctivae normal.   Cardiovascular:      Rate and Rhythm: Normal rate and regular rhythm. Heart sounds: Normal heart sounds. Pulmonary:      Effort: Pulmonary effort is normal. No respiratory distress. Breath sounds: Normal breath sounds. No stridor. No wheezing, rhonchi or rales. Skin:     General: Skin is warm. Neurological:      General: No focal deficit present.       Mental Status: She is alert and oriented to person, place, and time. Psychiatric:         Mood and Affect: Mood normal.         MEDICAL DECISION MAKING Assessment/Plan:     Chely was seen today for otalgia. Diagnoses and all orders for this visit:    Impacted cerumen of left ear    Need for Tdap vaccination  -     Tetanus-Diphth-Acell Pertussis (BOOSTRIX) injection 0.5 mL    Ruptured tympanic membrane, left  -     ofloxacin (FLOXIN) 0.3 % otic solution; Place 10 drops into the left ear daily for 7 days  -     amoxicillin-clavulanate (AUGMENTIN) 875-125 MG per tablet; Take 1 tablet by mouth 2 times daily for 7 days    Administrations This Visit       Tetanus-Diphth-Acell Pertussis (BOOSTRIX) injection 0.5 mL       Admin Date  11/28/2022  16:34 Action  Given Dose  0.5 mL Route  IntraMUSCular Site  Deltoid Left Administered By  Jay Jay [place] MA    Ordering Provider: АНДРЕЙ Booker CNP    NDC: 05688-111-32    Lot#: [id number]    : Britany Campbell    Patient Supplied?: No                  Cerumen impaction noted to the left ear. With patient's verbal consent, the left ear was flushed with Elephant Ear and wax was removed. Patient tolerated the procedure well. Tympanic membrane possible small rupture and mildly-erythematous TM following procedure. Hearing still decreased in the left ear. Will start on oral Augmentin and Ofloxacin drops for erythematous TM and TM rupture. AVS provided with instructions. Keep follow up visit on 12/12/22 with PCP for evaluation. Results for orders placed or performed during the hospital encounter of 11/27/22   T3, Free   Result Value Ref Range    T3, Free 4.32 2.02 - 4.43 pg/mL   T4, Free   Result Value Ref Range    Thyroxine, Free 1.32 0.93 - 1.70 ng/dL   TSH   Result Value Ref Range    TSH <0.01 (L) 0.30 - 5.00 uIU/mL       Patient counseled:     Patient given educational materials - see patientinstructions.   Discussed use, benefit, and side effects of prescribed medications. All patient questions answered. Pt verbalized understanding. Instructed to continue current medications, diet and exercise. Patient agreed with treatment plan. Follow up as directed.      Electronically signed by АНДРЕЙ Rai CNP on 11/28/2022 at 6:46 PM

## 2022-12-12 ENCOUNTER — OFFICE VISIT (OUTPATIENT)
Dept: PRIMARY CARE CLINIC | Age: 22
End: 2022-12-12
Payer: COMMERCIAL

## 2022-12-12 VITALS
HEART RATE: 86 BPM | BODY MASS INDEX: 28.9 KG/M2 | WEIGHT: 148 LBS | DIASTOLIC BLOOD PRESSURE: 72 MMHG | SYSTOLIC BLOOD PRESSURE: 114 MMHG | OXYGEN SATURATION: 98 %

## 2022-12-12 DIAGNOSIS — H91.92 HEARING LOSS OF LEFT EAR, UNSPECIFIED HEARING LOSS TYPE: Primary | ICD-10-CM

## 2022-12-12 DIAGNOSIS — Z23 NEED FOR HPV VACCINATION: ICD-10-CM

## 2022-12-12 DIAGNOSIS — H61.21 IMPACTED CERUMEN OF RIGHT EAR: ICD-10-CM

## 2022-12-12 DIAGNOSIS — Z23 NEED FOR IMMUNIZATION AGAINST INFLUENZA: ICD-10-CM

## 2022-12-12 PROCEDURE — 90651 9VHPV VACCINE 2/3 DOSE IM: CPT | Performed by: NURSE PRACTITIONER

## 2022-12-12 PROCEDURE — 90472 IMMUNIZATION ADMIN EACH ADD: CPT | Performed by: NURSE PRACTITIONER

## 2022-12-12 PROCEDURE — 90674 CCIIV4 VAC NO PRSV 0.5 ML IM: CPT | Performed by: NURSE PRACTITIONER

## 2022-12-12 PROCEDURE — 99214 OFFICE O/P EST MOD 30 MIN: CPT | Performed by: NURSE PRACTITIONER

## 2022-12-12 PROCEDURE — 90471 IMMUNIZATION ADMIN: CPT | Performed by: NURSE PRACTITIONER

## 2022-12-12 ASSESSMENT — PATIENT HEALTH QUESTIONNAIRE - PHQ9
2. FEELING DOWN, DEPRESSED OR HOPELESS: 0
SUM OF ALL RESPONSES TO PHQ9 QUESTIONS 1 & 2: 0
SUM OF ALL RESPONSES TO PHQ QUESTIONS 1-9: 0
1. LITTLE INTEREST OR PLEASURE IN DOING THINGS: 0
SUM OF ALL RESPONSES TO PHQ QUESTIONS 1-9: 0

## 2022-12-12 NOTE — PROGRESS NOTES
[facility] PRIMARY CARE  [address]  [address]  [address] [address]  Dept: [phone]  Dept Fax: [phone]    Angie Puga is a 23 y.o. female who presentstoday for her medical conditions/complaints as noted below. Angie Puga is c/o of  Chief Complaint   Patient presents with    Hearing Loss     F/u L side hearing loss.          HPI:     Here to f/u on L side hearing loss  Was seen in walk-in 11/28 for left side impacted cerumen and TM rupture, treated with ofloxacin drops and augmentin  Pain is improved   On exam, no s/s infection in left ear, cerumen impaction or TM rupture   Hearing is improved     HPV and flu vaccine administered       Hemoglobin A1C (%)   Date Value   08/22/2015 4.9             ( goal A1C is < 7)   No results found for: LABMICR  LDL Cholesterol (mg/dL)   Date Value   08/22/2015 136 (H)       (goal LDL is <100)   AST (U/L)   Date Value   09/11/2018 14     ALT (U/L)   Date Value   05/27/2022 12     BUN (mg/dL)   Date Value   09/11/2018 13     BP Readings from Last 3 Encounters:   12/12/22 114/72   11/28/22 103/62   11/04/22 124/72          ([id number])    Past Medical History:   Diagnosis Date    IUD (intrauterine device) in place     Thyroid disease 10/2018    Graves Disease      Past Surgical History:   Procedure Laterality Date    BREAST ENHANCEMENT SURGERY  2016    INTRAUTERINE DEVICE INSERTION  08/2016    Paragaalden Adams    TYMPANOSTOMY TUBE PLACEMENT      WISDOM TOOTH EXTRACTION         Family History   Problem Relation Age of Onset    Other Father         Autoimmune disease    Thyroid Disease Father     Asthma Brother        Social History     Tobacco Use    Smoking status: Never    Smokeless tobacco: Never   Substance Use Topics    Alcohol use: Yes     Comment: 1-2 drinks weekly      Current Outpatient Medications   Medication Sig Dispense Refill    carbamide peroxide (DEBROX) 6.5 % otic solution Place 3 drops in ear(s) Twice a Week 15 mL 1    propylthiouracil (PTU) 50 MG tablet        No current facility-administered medications for this visit. Allergies   Allergen Reactions    Latex     Tapazole [Methimazole] Hives       Health Maintenance   Topic Date Due    Hepatitis C screen  Never done    COVID-19 Vaccine (4 - Booster for Moderna series) 02/08/2022    Hepatitis A vaccine (2 of 2 - 2-dose series) 06/12/2023 (Originally 11/4/2010)    HIV screen  12/12/2023 (Originally 7/27/2013)    [address] screen  12/12/2023 (Originally 5/11/2021)    Pap smear  05/11/2023    Depression Screen  12/12/2023    DTaP/Tdap/Td vaccine (8 - Td or Tdap) 11/28/2032    Hib vaccine  Completed    HPV vaccine  Completed    Varicella vaccine  Completed    Meningococcal (ACWY) vaccine  Completed    Flu vaccine  Completed    Pneumococcal 0-64 years Vaccine  Aged Out       Subjective:      Review of Systems   Constitutional:  Negative for activity change, fatigue and fever. HENT:  Negative for congestion, rhinorrhea and sore throat. Eyes:  Negative for visual disturbance. Respiratory:  Negative for chest tightness and shortness of breath. Cardiovascular:  Negative for chest pain and palpitations. Gastrointestinal:  Negative for abdominal pain, diarrhea, nausea and vomiting. Endocrine: Negative for polydipsia. Genitourinary:  Negative for difficulty urinating. Musculoskeletal:  Negative for arthralgias and myalgias. Skin:  Negative for color change. Neurological:  Negative for weakness and headaches. Psychiatric/Behavioral:  Negative for behavioral problems. The patient is not nervous/anxious. All other systems reviewed and are negative. Objective:   /72   Pulse 86   Wt 148 lb (67.1 kg)   SpO2 98%   BMI 28.90 kg/m²   Physical Exam  Vitals reviewed. Constitutional:       General: She is not in acute distress. Appearance: Normal appearance. HENT:      Head: Normocephalic.       Right Ear: Tympanic membrane and external ear normal. There is impacted cerumen. Left Ear: Tympanic membrane, ear canal and external ear normal.   Eyes:      Pupils: Pupils are equal, round, and reactive to light. Cardiovascular:      Rate and Rhythm: Normal rate and regular rhythm. Pulses: Normal pulses. Heart sounds: Normal heart sounds. Pulmonary:      Effort: Pulmonary effort is normal.      Breath sounds: Normal breath sounds. Abdominal:      General: There is no distension. Musculoskeletal:         General: Normal range of motion. Right lower leg: No edema. Left lower leg: No edema. Skin:     General: Skin is warm and dry. Capillary Refill: Capillary refill takes less than 2 seconds. Neurological:      General: No focal deficit present. Mental Status: She is alert and oriented to person, place, and time. Psychiatric:         Mood and Affect: Mood normal.         Behavior: Behavior normal.         :       Diagnosis Orders   1. Hearing loss of left ear, unspecified hearing loss type        2. Impacted cerumen of right ear  carbamide peroxide (DEBROX) 6.5 % otic solution      3. Need for immunization against influenza  Influenza, FLUCELVAX, (age 10 mo+), IM, Preservative Free, 0.5 mL      4. Need for HPV vaccination  HPV, GARDASIL 9, (age 10-36 yrs), IM                :          1. Hearing loss of left ear, unspecified hearing loss type  Improved     2. Impacted cerumen of right ear  New, debrox gtts ordered and education given. F/u as needed for recheck if no improvement at home   - carbamide peroxide (DEBROX) 6.5 % otic solution; Place 3 drops in ear(s) Twice a Week  Dispense: 15 mL; Refill: 1    3. Need for immunization against influenza  - Influenza, FLUCELVAX, (age 10 mo+), IM, Preservative Free, 0.5 mL    4. Need for HPV vaccination  - HPV, GARDASIL 9, (age 10-36 yrs), IM    Return if symptoms worsen or fail to improve. Patient given educational materials - see patient instructions. Discussed use, benefit, and side effects of prescribed medications. All patient questions answered. Pt voiced understanding. Reviewed health maintenance. Instructed to continue current medications, diet and exercise. Patient agreed with treatment plan. Follow up as directed.        Electronicallysigned by АНДРЕЙ Carballo CNP on 12/17/2022 at 12:29 PM

## 2022-12-13 ENCOUNTER — TELEPHONE (OUTPATIENT)
Dept: PRIMARY CARE CLINIC | Age: 22
End: 2022-12-13

## 2022-12-13 NOTE — TELEPHONE ENCOUNTER
Patient called in with concerns of a possible allergic reaction. She states that she was seen yesterday for ear pain and hearing loss. She states that while at her appointment she received her Flu and last Gardasil vaccinations. She states this morning she has had a fever, body aches and nausea. She is asking what she can possible due to help and how long she should expect to have symptoms. Patient denies any trouble breathing, rash or hives.

## 2022-12-17 ASSESSMENT — ENCOUNTER SYMPTOMS
COLOR CHANGE: 0
ABDOMINAL PAIN: 0
SORE THROAT: 0
DIARRHEA: 0
NAUSEA: 0
VOMITING: 0
CHEST TIGHTNESS: 0
SHORTNESS OF BREATH: 0
RHINORRHEA: 0

## 2023-01-22 ENCOUNTER — HOSPITAL ENCOUNTER (OUTPATIENT)
Age: 23
Discharge: HOME | End: 2023-01-22
Payer: COMMERCIAL

## 2023-01-22 LAB
ALT SERPL-CCNC: 9 U/L (ref 5–33)
T3 FREE: 2.58 PG/ML (ref 2.02–4.43)
THYROXINE, FREE: 1.04 NG/DL (ref 0.93–1.7)
TSH SERPL DL<=0.05 MIU/L-ACNC: 0.01 UIU/ML (ref 0.3–5)

## 2023-01-22 PROCEDURE — 36415 COLL VENOUS BLD VENIPUNCTURE: CPT

## 2023-01-22 PROCEDURE — 84481 FREE ASSAY (FT-3): CPT

## 2023-01-22 PROCEDURE — 84439 ASSAY OF FREE THYROXINE: CPT

## 2023-01-22 PROCEDURE — 84460 ALANINE AMINO (ALT) (SGPT): CPT

## 2023-01-22 PROCEDURE — 84443 ASSAY THYROID STIM HORMONE: CPT

## 2023-04-29 ENCOUNTER — HOSPITAL ENCOUNTER (OUTPATIENT)
Age: 23
Discharge: HOME | End: 2023-04-29
Payer: COMMERCIAL

## 2023-04-29 LAB
T3FREE SERPL-MCNC: 2.25 PG/ML (ref 2.02–4.43)
T4 FREE SERPL-MCNC: 1 NG/DL (ref 0.9–1.7)
TSH SERPL-ACNC: 0.79 UIU/ML (ref 0.3–5)

## 2023-04-29 PROCEDURE — 84443 ASSAY THYROID STIM HORMONE: CPT

## 2023-04-29 PROCEDURE — 84481 FREE ASSAY (FT-3): CPT

## 2023-04-29 PROCEDURE — 36415 COLL VENOUS BLD VENIPUNCTURE: CPT

## 2023-04-29 PROCEDURE — 84439 ASSAY OF FREE THYROXINE: CPT

## 2023-06-01 ENCOUNTER — PATIENT MESSAGE (OUTPATIENT)
Dept: PRIMARY CARE CLINIC | Age: 23
End: 2023-06-01

## 2023-06-01 NOTE — TELEPHONE ENCOUNTER
From: Danny Nation  To: Len Sextoncitlalli  Sent: 6/1/2023 10:55 AM EDT  Subject: Prolonged Constipation Question    José Le,     I am emailing with slight concern as I have not had a full bowl movement in 2 weeks. (I had a very small bowl movement yesterday but not enough for me to feel comfortable). Other than the small volume everything else seemed normal.   One other thing I want to note, is that before this constipation happened, I noticed very very slight red mucus on my stool a couple weeks ago. Last night I took a Senna Laxative and no movement this morning so I took a dulcolax about an hour ago and I am now pretty nauseous and still no feeling of having to use the restroom. Over the past 2 weeks I have been feeling bloated but I do not have any urge to go poop. I am active, I have been drinking more water, and I eat fiber. Usually I am a 1x a day bowl movement type of person, so I am slightly concerned. Other than this I am in good health and my lifestyle hasnt changed. Please let me know if you have any advice.      Thank you,   Luisana Osuna

## 2023-06-03 ENCOUNTER — HOSPITAL ENCOUNTER (OUTPATIENT)
Age: 23
Discharge: HOME | End: 2023-06-03
Payer: COMMERCIAL

## 2023-06-03 LAB
T3FREE SERPL-MCNC: 2.36 PG/ML (ref 2.02–4.43)
T4 FREE SERPL-MCNC: 1 NG/DL (ref 0.9–1.7)
TSH SERPL-MCNC: 0.61 UIU/ML (ref 0.3–5)

## 2023-06-03 PROCEDURE — 84443 ASSAY THYROID STIM HORMONE: CPT

## 2023-06-03 PROCEDURE — 84439 ASSAY OF FREE THYROXINE: CPT

## 2023-06-03 PROCEDURE — 36415 COLL VENOUS BLD VENIPUNCTURE: CPT

## 2023-06-03 PROCEDURE — 84481 FREE ASSAY (FT-3): CPT

## 2023-08-14 ENCOUNTER — HOSPITAL ENCOUNTER (OUTPATIENT)
Age: 23
Setting detail: SPECIMEN
Discharge: HOME | End: 2023-08-14

## 2023-08-14 ENCOUNTER — OFFICE VISIT (OUTPATIENT)
Dept: OBGYN CLINIC | Age: 23
End: 2023-08-14
Payer: COMMERCIAL

## 2023-08-14 VITALS
SYSTOLIC BLOOD PRESSURE: 110 MMHG | WEIGHT: 135 LBS | BODY MASS INDEX: 26.5 KG/M2 | HEIGHT: 60 IN | DIASTOLIC BLOOD PRESSURE: 62 MMHG

## 2023-08-14 DIAGNOSIS — Z01.419 VISIT FOR GYNECOLOGIC EXAMINATION: Primary | ICD-10-CM

## 2023-08-14 DIAGNOSIS — N92.1 BREAKTHROUGH BLEEDING ON BIRTH CONTROL PILLS: ICD-10-CM

## 2023-08-14 PROCEDURE — 99395 PREV VISIT EST AGE 18-39: CPT | Performed by: NURSE PRACTITIONER

## 2023-08-14 RX ORDER — DROSPIRENONE 4 MG/1
1 TABLET, FILM COATED ORAL DAILY
Qty: 28 TABLET | Refills: 3 | Status: SHIPPED | OUTPATIENT
Start: 2023-08-14

## 2023-08-14 ASSESSMENT — ENCOUNTER SYMPTOMS
COLOR CHANGE: 0
SHORTNESS OF BREATH: 0
NAUSEA: 0
VOMITING: 0
COUGH: 0

## 2023-08-14 NOTE — PROGRESS NOTES
Alexander Cherry is a 20 y.o.  here for her annual exam.  The patient was seen and examined. The patients past medical, surgical, social and family history were reviewed. Current medications and allergies were reviewed, and documented in the chart. She is engaged to be  in 2023! Sheis employed at [employer]. Tobacco abuse No    Last PAP: -negative, hx of abnormal PAP no  Family hx uterine or ovarian cancer-deneis  Family hx of breast cancer -denies   family hx colon cancer -denies  HPV vaccine: completed      Sexually active: yes ,  Dyspareunia: No, Vaginal discharge: no,  UTI symptoms: no, voiding difficulties: no, bowels regular:Yes bloating:no      Menstrual history:  Menarche age- 6, cycle was regular but heavy she sent in message asking for OCP until wedding so didn't have heavy periods. Birth control: started POP Slynd in 2023 she states has been having period for around day 3 but then BTB sometimes just spotting or bleeding but still a lot lighter than when on before denies missing pills or vaginal discharge, itching, or odor.    LMP: 23  Last TSH 2023- 0.61  OB History    Para Term  AB Living   0 0 0 0 0 0   SAB IAB Ectopic Molar Multiple Live Births   0 0 0 0 0 0       Vitals:    23 1430   BP: 110/62   Site: Left Upper Arm   Position: Sitting   Cuff Size: Medium Adult   Weight: 135 lb (61.2 kg)   Height: 5' (1.524 m)       Wt Readings from Last 3 Encounters:   23 135 lb (61.2 kg)   22 148 lb (67.1 kg)   22 140 lb (63.5 kg)     Past Medical History:   Diagnosis Date    IUD (intrauterine device) in place     Thyroid disease 10/2018    Graves Disease                                                                   Past Surgical History:   Procedure Laterality Date    BREAST ENHANCEMENT SURGERY  2016    INTRAUTERINE DEVICE INSERTION  2016    Paragauranai Mullins    TYMPANOSTOMY [address]

## 2023-08-15 LAB
CANDIDA SPECIES: NEGATIVE
GARDNERELLA VAGINALIS: NEGATIVE
SOURCE: NORMAL
TRICHOMONAS: NEGATIVE

## 2023-08-17 DIAGNOSIS — N92.1 BREAKTHROUGH BLEEDING ON BIRTH CONTROL PILLS: ICD-10-CM

## 2023-08-17 DIAGNOSIS — N93.9 VAGINAL BLEEDING: ICD-10-CM

## 2023-08-17 DIAGNOSIS — Z01.419 VISIT FOR GYNECOLOGIC EXAMINATION: Primary | ICD-10-CM

## 2023-08-23 LAB — CYTOLOGY REPORT: NORMAL

## 2023-08-28 LAB
C TRACH DNA SPEC QL PROBE+SIG AMP: NEGATIVE
N GONORRHOEA DNA SPEC QL PROBE+SIG AMP: NEGATIVE
SPECIMEN DESCRIPTION: NORMAL

## 2023-10-03 ENCOUNTER — OFFICE VISIT (OUTPATIENT)
Dept: PRIMARY CARE CLINIC | Age: 23
End: 2023-10-03
Payer: COMMERCIAL

## 2023-10-03 VITALS
OXYGEN SATURATION: 95 % | BODY MASS INDEX: 25.94 KG/M2 | SYSTOLIC BLOOD PRESSURE: 122 MMHG | WEIGHT: 132.8 LBS | HEART RATE: 83 BPM | RESPIRATION RATE: 16 BRPM | DIASTOLIC BLOOD PRESSURE: 74 MMHG

## 2023-10-03 DIAGNOSIS — H66.90 ACUTE OTITIS MEDIA, UNSPECIFIED OTITIS MEDIA TYPE: Primary | ICD-10-CM

## 2023-10-03 DIAGNOSIS — T75.3XXD MOTION SICKNESS, SUBSEQUENT ENCOUNTER: ICD-10-CM

## 2023-10-03 DIAGNOSIS — R11.0 NAUSEA: ICD-10-CM

## 2023-10-03 PROCEDURE — 99214 OFFICE O/P EST MOD 30 MIN: CPT | Performed by: NURSE PRACTITIONER

## 2023-10-03 RX ORDER — DOXYCYCLINE HYCLATE 100 MG
100 TABLET ORAL 2 TIMES DAILY
Qty: 14 TABLET | Refills: 0 | Status: SHIPPED | OUTPATIENT
Start: 2023-10-03 | End: 2023-10-10

## 2023-10-03 RX ORDER — ONDANSETRON 4 MG/1
4 TABLET, FILM COATED ORAL DAILY PRN
Qty: 30 TABLET | Refills: 0 | Status: SHIPPED | OUTPATIENT
Start: 2023-10-03

## 2023-10-03 SDOH — ECONOMIC STABILITY: FOOD INSECURITY: WITHIN THE PAST 12 MONTHS, THE FOOD YOU BOUGHT JUST DIDN'T LAST AND YOU DIDN'T HAVE MONEY TO GET MORE.: NEVER TRUE

## 2023-10-03 SDOH — ECONOMIC STABILITY: INCOME INSECURITY: HOW HARD IS IT FOR YOU TO PAY FOR THE VERY BASICS LIKE FOOD, HOUSING, MEDICAL CARE, AND HEATING?: NOT HARD AT ALL

## 2023-10-03 SDOH — ECONOMIC STABILITY: FOOD INSECURITY: WITHIN THE PAST 12 MONTHS, YOU WORRIED THAT YOUR FOOD WOULD RUN OUT BEFORE YOU GOT MONEY TO BUY MORE.: NEVER TRUE

## 2023-10-03 SDOH — ECONOMIC STABILITY: HOUSING INSECURITY
IN THE LAST 12 MONTHS, WAS THERE A TIME WHEN YOU DID NOT HAVE A STEADY PLACE TO SLEEP OR SLEPT IN A SHELTER (INCLUDING NOW)?: NO

## 2023-10-03 ASSESSMENT — PATIENT HEALTH QUESTIONNAIRE - PHQ9
2. FEELING DOWN, DEPRESSED OR HOPELESS: 0
SUM OF ALL RESPONSES TO PHQ9 QUESTIONS 1 & 2: 0
1. LITTLE INTEREST OR PLEASURE IN DOING THINGS: 0
SUM OF ALL RESPONSES TO PHQ QUESTIONS 1-9: 0

## 2023-10-03 ASSESSMENT — ENCOUNTER SYMPTOMS
VOMITING: 0
NAUSEA: 0
RHINORRHEA: 0
SORE THROAT: 0
SHORTNESS OF BREATH: 0
ABDOMINAL PAIN: 0
DIARRHEA: 0
COLOR CHANGE: 0
CHEST TIGHTNESS: 0

## 2023-11-30 ENCOUNTER — HOSPITAL ENCOUNTER (OUTPATIENT)
Age: 23
Discharge: HOME | End: 2023-11-30
Payer: COMMERCIAL

## 2023-11-30 LAB
ALT SERPL-CCNC: 13 U/L (ref 5–33)
T3FREE SERPL-MCNC: 5.77 PG/ML (ref 2.02–4.43)
T4 FREE SERPL-MCNC: 2.1 NG/DL (ref 0.9–1.7)
TSH SERPL DL<=0.05 MIU/L-ACNC: <0.01 UIU/ML (ref 0.3–5)

## 2023-11-30 PROCEDURE — 36415 COLL VENOUS BLD VENIPUNCTURE: CPT

## 2023-11-30 PROCEDURE — 84443 ASSAY THYROID STIM HORMONE: CPT

## 2023-11-30 PROCEDURE — 84460 ALANINE AMINO (ALT) (SGPT): CPT

## 2023-11-30 PROCEDURE — 84481 FREE ASSAY (FT-3): CPT

## 2023-11-30 PROCEDURE — 84439 ASSAY OF FREE THYROXINE: CPT

## 2024-01-04 ENCOUNTER — HOSPITAL ENCOUNTER (OUTPATIENT)
Age: 24
Setting detail: SPECIMEN
Discharge: HOME | End: 2024-01-04

## 2024-01-04 LAB
T3FREE SERPL-MCNC: 5 PG/ML (ref 2.02–4.43)
T4 FREE SERPL-MCNC: 1.5 NG/DL (ref 0.9–1.7)
TSH SERPL DL<=0.05 MIU/L-ACNC: <0.01 UIU/ML (ref 0.3–5)

## 2024-02-09 ENCOUNTER — HOSPITAL ENCOUNTER (OUTPATIENT)
Age: 24
Setting detail: SPECIMEN
Discharge: HOME | End: 2024-02-09

## 2024-02-09 LAB
ALT SERPL-CCNC: 15 U/L (ref 5–33)
T3FREE SERPL-MCNC: 2.44 PG/ML (ref 2.02–4.43)
T4 FREE SERPL-MCNC: 0.8 NG/DL (ref 0.9–1.7)
TSH SERPL DL<=0.05 MIU/L-ACNC: 0.2 UIU/ML (ref 0.3–5)

## 2024-03-05 ENCOUNTER — OFFICE VISIT (OUTPATIENT)
Dept: PRIMARY CARE CLINIC | Age: 24
End: 2024-03-05
Payer: COMMERCIAL

## 2024-03-05 VITALS
HEART RATE: 103 BPM | BODY MASS INDEX: 27.3 KG/M2 | OXYGEN SATURATION: 99 % | SYSTOLIC BLOOD PRESSURE: 122 MMHG | RESPIRATION RATE: 14 BRPM | DIASTOLIC BLOOD PRESSURE: 84 MMHG | WEIGHT: 139.8 LBS

## 2024-03-05 DIAGNOSIS — H65.191 ACUTE OTITIS MEDIA WITH EFFUSION OF RIGHT EAR: ICD-10-CM

## 2024-03-05 DIAGNOSIS — J35.8 TONSILLITH: ICD-10-CM

## 2024-03-05 DIAGNOSIS — J02.9 SORE THROAT: Primary | ICD-10-CM

## 2024-03-05 LAB
STREP PYOGENES DNA, POC: NEGATIVE
VALID INTERNAL CONTROL, POC: PRESENT

## 2024-03-05 PROCEDURE — 99214 OFFICE O/P EST MOD 30 MIN: CPT | Performed by: NURSE PRACTITIONER

## 2024-03-05 PROCEDURE — 87651 STREP A DNA AMP PROBE: CPT | Performed by: NURSE PRACTITIONER

## 2024-03-05 RX ORDER — AMOXICILLIN AND CLAVULANATE POTASSIUM 875; 125 MG/1; MG/1
1 TABLET, FILM COATED ORAL 2 TIMES DAILY
Qty: 14 TABLET | Refills: 0 | Status: SHIPPED | OUTPATIENT
Start: 2024-03-05 | End: 2024-03-12

## 2024-03-05 RX ORDER — PREDNISONE 20 MG/1
20 TABLET ORAL DAILY
Qty: 5 TABLET | Refills: 0 | Status: SHIPPED | OUTPATIENT
Start: 2024-03-05 | End: 2024-03-10

## 2024-03-05 ASSESSMENT — PATIENT HEALTH QUESTIONNAIRE - PHQ9
SUM OF ALL RESPONSES TO PHQ QUESTIONS 1-9: 0
SUM OF ALL RESPONSES TO PHQ9 QUESTIONS 1 & 2: 0
1. LITTLE INTEREST OR PLEASURE IN DOING THINGS: 0
SUM OF ALL RESPONSES TO PHQ QUESTIONS 1-9: 0
SUM OF ALL RESPONSES TO PHQ QUESTIONS 1-9: 0
2. FEELING DOWN, DEPRESSED OR HOPELESS: 0
SUM OF ALL RESPONSES TO PHQ QUESTIONS 1-9: 0

## 2024-03-05 ASSESSMENT — ENCOUNTER SYMPTOMS
RHINORRHEA: 0
CHEST TIGHTNESS: 0
DIARRHEA: 0
SHORTNESS OF BREATH: 0
COLOR CHANGE: 0
SORE THROAT: 1

## 2024-03-05 NOTE — PROGRESS NOTES
[facility]  [facility] PRIMARY CARE  [facility] [facility] [address]  Dept: [phone]  Dept Fax: [phone]    Elana Georges is a 23 y.o. female who presentstoday for her medical conditions/complaints as noted below.  Elana Georges is c/o of  Chief Complaint   Patient presents with    Pharyngitis     Intermittent sore throat x 1 week; worsening x 2 days         HPI:     Presents with acute complaint of sore throat x 7 days and worsening in last 2 days  Strep negative in office   No fever or ill contacts, denies associated symptoms   Has used otc antihistamine without relief   On exam has AOM on right, has slight fullness and popping but denies pain  No sinus symptoms          Hemoglobin A1C (%)   Date Value   08/22/2015 4.9             ( goal A1C is < 7)   No components found for: \"LABMICR\"  LDL Cholesterol (mg/dL)   Date Value   08/22/2015 136 (H)       (goal LDL is <100)   AST (U/L)   Date Value   09/11/2018 14     ALT (U/L)   Date Value   02/09/2024 15     BUN (mg/dL)   Date Value   09/11/2018 13     BP Readings from Last 3 Encounters:   03/05/24 122/84   10/03/23 122/74   08/14/23 110/62          ([id number])    Past Medical History:   Diagnosis Date    Hyperthyroidism 10/1/2018    Graves disease    IUD (intrauterine device) in place     Thyroid disease 10/2018    Graves Disease      Past Surgical History:   Procedure Laterality Date    BREAST ENHANCEMENT SURGERY  2016    INTRAUTERINE DEVICE INSERTION  08/2016    Paragaurd Dr. Shetty    TYMPANOSTOMY TUBE PLACEMENT      WISDOM TOOTH EXTRACTION         Family History   Problem Relation Age of Onset    Other Father         Autoimmune disease    Thyroid Disease Father     Asthma Brother        Social History     Tobacco Use    Smoking status: Never    Smokeless tobacco: Never   Substance Use Topics    Alcohol use: Yes     Comment: Occasional glass of wine or hard seltzer. > 3x a month      Current Outpatient Medications   Medication Sig

## 2024-03-29 ENCOUNTER — HOSPITAL ENCOUNTER (OUTPATIENT)
Age: 24
Setting detail: SPECIMEN
Discharge: HOME | End: 2024-03-29

## 2024-03-29 LAB
T3FREE SERPL-MCNC: 2.5 PG/ML (ref 2–4.4)
T4 FREE SERPL-MCNC: 0.7 NG/DL (ref 0.92–1.68)
TSH SERPL DL<=0.05 MIU/L-ACNC: 2.58 UIU/ML (ref 0.27–4.2)

## 2024-05-24 ENCOUNTER — OFFICE VISIT (OUTPATIENT)
Dept: OBGYN CLINIC | Age: 24
End: 2024-05-24
Payer: COMMERCIAL

## 2024-05-24 VITALS
HEIGHT: 60 IN | SYSTOLIC BLOOD PRESSURE: 116 MMHG | BODY MASS INDEX: 26.9 KG/M2 | DIASTOLIC BLOOD PRESSURE: 72 MMHG | WEIGHT: 137 LBS

## 2024-05-24 DIAGNOSIS — Z01.419 WELL WOMAN EXAM WITH ROUTINE GYNECOLOGICAL EXAM: ICD-10-CM

## 2024-05-24 DIAGNOSIS — Z86.39 HISTORY OF GRAVES' DISEASE: Primary | ICD-10-CM

## 2024-05-24 PROCEDURE — 99459 PELVIC EXAMINATION: CPT | Performed by: OBSTETRICS & GYNECOLOGY

## 2024-05-24 PROCEDURE — 99385 PREV VISIT NEW AGE 18-39: CPT | Performed by: OBSTETRICS & GYNECOLOGY

## 2024-05-24 NOTE — PROGRESS NOTES
atraumatic.   Neck: Normal range of motion. No thyromegaly present.   Cardiovascular: Normal rate, regular rhythm and normal heart sounds. Exam reveals no gallop and no friction rub.    No murmur heard.  Pulmonary/Chest: Effort normal and breath sounds normal. No respiratory distress. She has no wheezes. She has no rales.   Abdominal: Soft. Bowel sounds are normal. She exhibits no distension and no mass. There is no tenderness. There is no rebound and no guarding.   Skin: She is not diaphoretic.   Psychiatric: She has a normal mood and affect. Her behavior is normal. Thought content normal. .    Pelvic:   External genitalia wnl, no lesions, rashes  Clitoris and urethra midline  Vagina pink, moist, well rugated  Cervix without lesion/masses, DC wnl  Uterus normal in size and contour, no masses, NTTP  Adnexa without masses, NTTP    Breasts:   Symmetric, no lesions, masses, rashes, no abnl nipple Dc     Chaperone used for pelvic exam    Counseling:  Discussed General Recommendations:  -Routine Pap (unless cervix removed for benign reasons)  -STD screening annually pts </= 26yo or high risk   -lipid profile every 5 yrs  -Tdap once and then Td every 10yrs  -Influenza Vaccine, annually  -Healthy eating/exercise       ASSESSMENT/PLAN:   23 y.o., , for annual GYN exam:    1) Annual:   -Pap up to date. Patient to send in    -Declines STD testing    -Rx none  - Referral placed to endocrine     -safe sexual practices    -FU w/ PCP for all non-GYN medical issues and regular screening     -annual Flu vaccine recommended    -healthy eating, exercise       Orders Placed This Encounter   Procedures    [facility] - Clint Montes MD, Endocrinology, [place]     Referral Priority:   Routine     Referral Type:   Eval and Treat     Referral Reason:   Specialty Services Required     Referred to Provider:   Clint Montes MD     Requested Specialty:   Endocrinology     Number of Visits Requested:   1           Anna Wood DO

## 2024-06-30 SDOH — HEALTH STABILITY: PHYSICAL HEALTH: ON AVERAGE, HOW MANY DAYS PER WEEK DO YOU ENGAGE IN MODERATE TO STRENUOUS EXERCISE (LIKE A BRISK WALK)?: 6 DAYS

## 2024-06-30 SDOH — HEALTH STABILITY: PHYSICAL HEALTH: ON AVERAGE, HOW MANY MINUTES DO YOU ENGAGE IN EXERCISE AT THIS LEVEL?: 30 MIN

## 2024-07-03 ENCOUNTER — OFFICE VISIT (OUTPATIENT)
Dept: FAMILY MEDICINE CLINIC | Facility: CLINIC | Age: 24
End: 2024-07-03
Payer: COMMERCIAL

## 2024-07-03 VITALS
SYSTOLIC BLOOD PRESSURE: 108 MMHG | DIASTOLIC BLOOD PRESSURE: 78 MMHG | BODY MASS INDEX: 26.56 KG/M2 | OXYGEN SATURATION: 98 % | WEIGHT: 136 LBS | HEART RATE: 82 BPM | TEMPERATURE: 98.4 F

## 2024-07-03 DIAGNOSIS — Z13.220 ENCOUNTER FOR SCREENING FOR LIPID DISORDER: ICD-10-CM

## 2024-07-03 DIAGNOSIS — D22.9 CHANGE IN MOLE: ICD-10-CM

## 2024-07-03 DIAGNOSIS — E78.00 ELEVATED CHOLESTEROL: ICD-10-CM

## 2024-07-03 DIAGNOSIS — E05.90 HYPERTHYROIDISM: ICD-10-CM

## 2024-07-03 DIAGNOSIS — E05.90 HYPERTHYROIDISM: Primary | ICD-10-CM

## 2024-07-03 DIAGNOSIS — Z12.83 SCREENING EXAM FOR SKIN CANCER: Primary | ICD-10-CM

## 2024-07-03 DIAGNOSIS — Z12.83 SCREENING EXAM FOR SKIN CANCER: ICD-10-CM

## 2024-07-03 PROBLEM — J02.0 STREP THROAT: Status: RESOLVED | Noted: 2021-10-07 | Resolved: 2024-07-03

## 2024-07-03 PROBLEM — G43.109 MIGRAINE WITH AURA AND WITHOUT STATUS MIGRAINOSUS, NOT INTRACTABLE: Status: RESOLVED | Noted: 2021-08-12 | Resolved: 2024-07-03

## 2024-07-03 LAB
ALBUMIN SERPL-MCNC: 4.4 G/DL (ref 3.5–5)
ALBUMIN/GLOB SERPL: 1.4 (ref 1–1.9)
ALP SERPL-CCNC: 51 U/L (ref 35–104)
ALT SERPL-CCNC: 22 U/L (ref 12–65)
ANION GAP SERPL CALC-SCNC: 10 MMOL/L (ref 9–18)
AST SERPL-CCNC: 24 U/L (ref 15–37)
BASOPHILS # BLD: 0 K/UL (ref 0–0.2)
BASOPHILS NFR BLD: 1 % (ref 0–2)
BILIRUB SERPL-MCNC: 0.2 MG/DL (ref 0–1.2)
BUN SERPL-MCNC: 15 MG/DL (ref 6–23)
CALCIUM SERPL-MCNC: 10.1 MG/DL (ref 8.8–10.2)
CHLORIDE SERPL-SCNC: 104 MMOL/L (ref 98–107)
CHOLEST SERPL-MCNC: 303 MG/DL (ref 0–200)
CO2 SERPL-SCNC: 25 MMOL/L (ref 20–28)
CREAT SERPL-MCNC: 0.73 MG/DL (ref 0.6–1.1)
DIFFERENTIAL METHOD BLD: ABNORMAL
EOSINOPHIL # BLD: 0 K/UL (ref 0–0.8)
EOSINOPHIL NFR BLD: 0 % (ref 0.5–7.8)
ERYTHROCYTE [DISTWIDTH] IN BLOOD BY AUTOMATED COUNT: 11.9 % (ref 11.9–14.6)
GLOBULIN SER CALC-MCNC: 3.2 G/DL (ref 2.3–3.5)
GLUCOSE SERPL-MCNC: 89 MG/DL (ref 70–99)
HCT VFR BLD AUTO: 42.3 % (ref 35.8–46.3)
HDLC SERPL-MCNC: 77 MG/DL (ref 40–60)
HDLC SERPL: 3.9 (ref 0–5)
HGB BLD-MCNC: 13.7 G/DL (ref 11.7–15.4)
IMM GRANULOCYTES # BLD AUTO: 0 K/UL (ref 0–0.5)
IMM GRANULOCYTES NFR BLD AUTO: 0 % (ref 0–5)
LDLC SERPL CALC-MCNC: 208 MG/DL (ref 0–100)
LYMPHOCYTES # BLD: 1.6 K/UL (ref 0.5–4.6)
LYMPHOCYTES NFR BLD: 34 % (ref 13–44)
MCH RBC QN AUTO: 30.9 PG (ref 26.1–32.9)
MCHC RBC AUTO-ENTMCNC: 32.4 G/DL (ref 31.4–35)
MCV RBC AUTO: 95.5 FL (ref 82–102)
MONOCYTES # BLD: 0.2 K/UL (ref 0.1–1.3)
MONOCYTES NFR BLD: 5 % (ref 4–12)
NEUTS SEG # BLD: 2.9 K/UL (ref 1.7–8.2)
NEUTS SEG NFR BLD: 60 % (ref 43–78)
NRBC # BLD: 0 K/UL (ref 0–0.2)
PLATELET # BLD AUTO: 319 K/UL (ref 150–450)
PMV BLD AUTO: 10 FL (ref 9.4–12.3)
POTASSIUM SERPL-SCNC: 4.8 MMOL/L (ref 3.5–5.1)
PROT SERPL-MCNC: 7.6 G/DL (ref 6.3–8.2)
RBC # BLD AUTO: 4.43 M/UL (ref 4.05–5.2)
SODIUM SERPL-SCNC: 139 MMOL/L (ref 136–145)
T4 FREE SERPL-MCNC: 0.9 NG/DL (ref 0.9–1.7)
TRIGL SERPL-MCNC: 88 MG/DL (ref 0–150)
TSH, 3RD GENERATION: 2.11 UIU/ML (ref 0.27–4.2)
VLDLC SERPL CALC-MCNC: 18 MG/DL (ref 6–23)
WBC # BLD AUTO: 4.8 K/UL (ref 4.3–11.1)

## 2024-07-03 PROCEDURE — 99203 OFFICE O/P NEW LOW 30 MIN: CPT | Performed by: NURSE PRACTITIONER

## 2024-07-03 ASSESSMENT — ENCOUNTER SYMPTOMS
DIARRHEA: 0
NAUSEA: 1
ABDOMINAL PAIN: 0
EYES NEGATIVE: 1
CONSTIPATION: 0
VOMITING: 0
BLOOD IN STOOL: 0
RESPIRATORY NEGATIVE: 1

## 2024-07-03 NOTE — PATIENT INSTRUCTIONS
Patient Education        Learning About the Mediterranean Diet  What is the Mediterranean diet?     The Mediterranean diet is a style of eating rather than a diet plan. It features foods eaten in [place], [place], southern [place], and other countries along the [place] Sea. It emphasizes eating foods like fish, fruits, vegetables, beans, high-fiber breads and whole grains, nuts, and olive oil. This style of eating includes limited red meat, cheese, and sweets.  Why choose the Mediterranean diet?  A Mediterranean-style diet may improve heart health. It contains more fat than other heart-healthy diets. But the fats are mainly from nuts, unsaturated oils (such as fish oils and olive oil), and certain nut or seed oils (such as canola, soybean, or flaxseed oil). These fats may help protect the heart and blood vessels.  How can you get started on the Mediterranean diet?  Here are some things you can do to switch to a more Mediterranean way of eating.  What to eat  Eat a variety of fruits and vegetables each day, such as grapes, blueberries, tomatoes, broccoli, peppers, figs, olives, spinach, eggplant, beans, lentils, and chickpeas.  Eat a variety of whole-grain foods each day, such as oats, brown rice, and whole wheat bread, pasta, and couscous.  Eat fish at least 2 times a week. Try tuna, salmon, mackerel, lake trout, herring, or sardines.  Eat moderate amounts of low-fat dairy products, such as milk, cheese, or yogurt.  Eat moderate amounts of poultry and eggs.  Choose healthy (unsaturated) fats, such as nuts, olive oil, and certain nut or seed oils like canola, soybean, and flaxseed.  Limit unhealthy (saturated) fats, such as butter, palm oil, and coconut oil. And limit fats found in animal products, such as meat and dairy products made with whole milk. Try to eat red meat only a few times a month in very small amounts.  Limit sweets and desserts to only a few times a week. This includes sugar-sweetened

## 2024-07-03 NOTE — PROGRESS NOTES
per recent endocrinology and [place] that she would like completed here.  He is requesting dermatology referral for skin cancer screening and evaluation of mole on her upper back.  She will call the office back with provider of choice.         Review of Systems   Constitutional: Negative.    HENT: Negative.          Hx ear infections.   Eyes: Negative.    Respiratory: Negative.     Cardiovascular: Negative.    Gastrointestinal:  Positive for nausea. Negative for abdominal pain, blood in stool, constipation, diarrhea and vomiting.        Once a month very nauseated every since moved here.   Endocrine: Negative.    Genitourinary: Negative.    Musculoskeletal: Negative.    Skin:         Right back upper mole   Allergic/Immunologic: Negative for environmental allergies and food allergies.   Neurological:  Negative for dizziness, syncope, weakness, numbness and headaches.   Hematological: Negative.    Psychiatric/Behavioral:  Negative for dysphoric mood and sleep disturbance. The patient is not nervous/anxious.         /78   Pulse 82   Temp 98.4 °F (36.9 °C)   Wt 61.7 kg (136 lb)   LMP 06/27/2024   SpO2 98%   BMI 26.56 kg/m²     Physical Exam  Constitutional:       General: She is not in acute distress.     Appearance: Normal appearance. She is normal weight. She is not ill-appearing.   HENT:      Head: Normocephalic and atraumatic.      Right Ear: External ear normal.      Left Ear: External ear normal.   Eyes:      Extraocular Movements: Extraocular movements intact.      Conjunctiva/sclera: Conjunctivae normal.      Pupils: Pupils are equal, round, and reactive to light.   Cardiovascular:      Rate and Rhythm: Normal rate and regular rhythm.      Pulses: Normal pulses.      Heart sounds: Normal heart sounds.   Pulmonary:      Effort: Pulmonary effort is normal.      Breath sounds: Normal breath sounds.   Abdominal:      General: Bowel sounds are normal. There is no distension.      Palpations: Abdomen is

## 2024-07-04 NOTE — RESULT ENCOUNTER NOTE
Please call pt and schedule fasting lipid panel lab in three months.  I reviewed her labs on [facility].    Please let me know if patient has any further questions or concerns.

## 2024-07-05 LAB — T3FREE SERPL-MCNC: 2.6 PG/ML (ref 2–4.4)

## 2024-08-07 ENCOUNTER — OFFICE VISIT (OUTPATIENT)
Dept: ENDOCRINOLOGY | Age: 24
End: 2024-08-07
Payer: COMMERCIAL

## 2024-08-07 VITALS
WEIGHT: 139 LBS | SYSTOLIC BLOOD PRESSURE: 98 MMHG | HEIGHT: 60 IN | HEART RATE: 91 BPM | BODY MASS INDEX: 27.29 KG/M2 | OXYGEN SATURATION: 99 % | DIASTOLIC BLOOD PRESSURE: 64 MMHG

## 2024-08-07 DIAGNOSIS — E05.00 GRAVES' DISEASE: Primary | ICD-10-CM

## 2024-08-07 PROBLEM — E05.90 HYPERTHYROIDISM: Status: RESOLVED | Noted: 2018-09-11 | Resolved: 2024-08-07

## 2024-08-07 PROCEDURE — 99204 OFFICE O/P NEW MOD 45 MIN: CPT | Performed by: INTERNAL MEDICINE

## 2024-08-07 RX ORDER — PROPYLTHIOURACIL 50 MG/1
50 TABLET ORAL 2 TIMES DAILY
Qty: 180 TABLET | Refills: 3
Start: 2024-08-07

## 2024-08-07 ASSESSMENT — PATIENT HEALTH QUESTIONNAIRE - PHQ9
SUM OF ALL RESPONSES TO PHQ QUESTIONS 1-9: 0
SUM OF ALL RESPONSES TO PHQ QUESTIONS 1-9: 0
SUM OF ALL RESPONSES TO PHQ9 QUESTIONS 1 & 2: 0
SUM OF ALL RESPONSES TO PHQ QUESTIONS 1-9: 0
2. FEELING DOWN, DEPRESSED OR HOPELESS: NOT AT ALL
SUM OF ALL RESPONSES TO PHQ QUESTIONS 1-9: 0
1. LITTLE INTEREST OR PLEASURE IN DOING THINGS: NOT AT ALL

## 2024-08-07 ASSESSMENT — ENCOUNTER SYMPTOMS
CONSTIPATION: 0
DIARRHEA: 0

## 2024-08-07 NOTE — PROGRESS NOTES
TSH 2.110, free T4 0.9, free T3 2.6, LFTs normal.    Pregnancy status: No current pregnancy plans.      Review of Systems   Constitutional:  Negative for diaphoresis, fatigue and unexpected weight change.   Eyes:         Denies proptosis, excessive tearing, redness, eye pain.   Cardiovascular:  Negative for palpitations.   Gastrointestinal:  Negative for constipation and diarrhea.   Endocrine: Negative for cold intolerance and heat intolerance.   Genitourinary:  Positive for menstrual problem (past 4 months; LMP last week).   Neurological:  Negative for tremors.       Vital Signs:  BP 98/64 (Site: Left Upper Arm, Position: Sitting)   Pulse 91   Ht 1.524 m (5')   Wt 63 kg (139 lb)   LMP 08/02/2024 (Exact Date)   SpO2 99%   BMI 27.15 kg/m²     Physical Exam  Constitutional:       General: She is not in acute distress.  Eyes:      Comments: No proptosis.   Neck:      Thyroid: No thyroid mass or thyromegaly.   Cardiovascular:      Rate and Rhythm: Normal rate and regular rhythm.   Lymphadenopathy:      Cervical: No cervical adenopathy.   Neurological:      Motor: No tremor.         Orders Placed This Encounter   Procedures    TSH     Standing Status:   Future     Standing Expiration Date:   8/7/2025    T4, Free     Standing Status:   Future     Standing Expiration Date:   8/7/2025    T3, Free     Standing Status:   Future     Standing Expiration Date:   8/7/2025    Thyroid Stimulating Immunoglobulin     Standing Status:   Future     Standing Expiration Date:   8/7/2025    Hepatic Function Panel     Standing Status:   Future     Standing Expiration Date:   8/7/2025       Current Outpatient Medications   Medication Sig Dispense Refill    propylthiouracil (PTU) 50 MG tablet Take 1 tablet by mouth 2 times daily 180 tablet 3     No current facility-administered medications for this visit.

## 2024-11-06 DIAGNOSIS — E05.00 GRAVES' DISEASE: ICD-10-CM

## 2024-11-06 DIAGNOSIS — E78.00 ELEVATED CHOLESTEROL: ICD-10-CM

## 2024-11-06 LAB
ALBUMIN SERPL-MCNC: 4.2 G/DL (ref 3.5–5)
ALBUMIN/GLOB SERPL: 1.4 (ref 1–1.9)
ALP SERPL-CCNC: 54 U/L (ref 35–104)
ALT SERPL-CCNC: 17 U/L (ref 8–45)
AST SERPL-CCNC: 21 U/L (ref 15–37)
BILIRUB DIRECT SERPL-MCNC: <0.2 MG/DL (ref 0–0.4)
BILIRUB SERPL-MCNC: 0.3 MG/DL (ref 0–1.2)
CHOLEST SERPL-MCNC: 293 MG/DL (ref 0–200)
GLOBULIN SER CALC-MCNC: 3 G/DL (ref 2.3–3.5)
HDLC SERPL-MCNC: 74 MG/DL (ref 40–60)
HDLC SERPL: 3.9 (ref 0–5)
LDLC SERPL CALC-MCNC: 200 MG/DL (ref 0–100)
PROT SERPL-MCNC: 7.2 G/DL (ref 6.3–8.2)
T4 FREE SERPL-MCNC: 1.1 NG/DL (ref 0.9–1.7)
TRIGL SERPL-MCNC: 95 MG/DL (ref 0–150)
TSH, 3RD GENERATION: 1.44 UIU/ML (ref 0.27–4.2)
VLDLC SERPL CALC-MCNC: 19 MG/DL (ref 6–23)

## 2024-11-07 LAB
T3FREE SERPL-MCNC: 2.8 PG/ML (ref 2–4.4)
TSI ACT/NOR SER: 1.57 IU/L (ref 0–0.55)

## 2024-11-12 DIAGNOSIS — E78.5 HYPERLIPIDEMIA, UNSPECIFIED HYPERLIPIDEMIA TYPE: Primary | ICD-10-CM

## 2024-11-13 DIAGNOSIS — Z83.438 FAMILY HISTORY OF HYPERLIPIDEMIA: ICD-10-CM

## 2024-11-13 DIAGNOSIS — E78.5 HYPERLIPIDEMIA, UNSPECIFIED HYPERLIPIDEMIA TYPE: Primary | ICD-10-CM

## 2024-11-13 RX ORDER — ROSUVASTATIN CALCIUM 20 MG/1
20 TABLET, COATED ORAL NIGHTLY
Qty: 90 TABLET | Refills: 2 | Status: SHIPPED | OUTPATIENT
Start: 2024-11-13 | End: 2024-11-19

## 2024-11-15 ENCOUNTER — OFFICE VISIT (OUTPATIENT)
Dept: ENDOCRINOLOGY | Age: 24
End: 2024-11-15
Payer: COMMERCIAL

## 2024-11-15 VITALS
HEART RATE: 88 BPM | RESPIRATION RATE: 14 BRPM | SYSTOLIC BLOOD PRESSURE: 112 MMHG | OXYGEN SATURATION: 98 % | WEIGHT: 141 LBS | HEIGHT: 60 IN | DIASTOLIC BLOOD PRESSURE: 68 MMHG | BODY MASS INDEX: 27.68 KG/M2

## 2024-11-15 DIAGNOSIS — E05.00 GRAVES' DISEASE: Primary | ICD-10-CM

## 2024-11-15 PROCEDURE — 99214 OFFICE O/P EST MOD 30 MIN: CPT | Performed by: INTERNAL MEDICINE

## 2024-11-15 RX ORDER — PROPYLTHIOURACIL 50 MG/1
50 TABLET ORAL 2 TIMES DAILY
Qty: 180 TABLET | Refills: 3 | Status: SHIPPED | OUTPATIENT
Start: 2024-11-15

## 2024-11-15 ASSESSMENT — ENCOUNTER SYMPTOMS
DIARRHEA: 0
CONSTIPATION: 0

## 2024-11-15 NOTE — PROGRESS NOTES
normal.    Pregnancy status: No current pregnancy plans.      Review of Systems   Constitutional:  Negative for diaphoresis and fatigue.        Weight increased 2 pounds since last visit.   Eyes:         Denies proptosis, excessive tearing, redness, eye pain.   Cardiovascular:  Negative for palpitations.   Gastrointestinal:  Negative for constipation and diarrhea.   Endocrine: Negative for cold intolerance and heat intolerance.   Genitourinary:  Negative for menstrual problem.   Neurological:  Negative for tremors.       Vital Signs:  /68 (Site: Left Upper Arm, Position: Sitting, Cuff Size: Medium Adult)   Pulse 88   Resp 14   Ht 1.524 m (5')   Wt 64 kg (141 lb)   SpO2 98%   BMI 27.54 kg/m²     Wt Readings from Last 3 Encounters:   11/15/24 64 kg (141 lb)   08/07/24 63 kg (139 lb)   07/03/24 61.7 kg (136 lb)       Physical Exam  Constitutional:       General: She is not in acute distress.  Eyes:      Comments: No proptosis.   Neck:      Thyroid: No thyroid mass or thyromegaly.   Cardiovascular:      Rate and Rhythm: Normal rate and regular rhythm.   Lymphadenopathy:      Cervical: No cervical adenopathy.   Neurological:      Motor: No tremor.         Orders Placed This Encounter   Procedures    TSH     Standing Status:   Future     Standing Expiration Date:   11/15/2025    T4, Free     Standing Status:   Future     Standing Expiration Date:   11/15/2025    T3, Free     Standing Status:   Future     Standing Expiration Date:   11/15/2025       Current Outpatient Medications   Medication Sig Dispense Refill    propylthiouracil (PTU) 50 MG tablet Take 1 tablet by mouth 2 times daily 180 tablet 3    rosuvastatin (CRESTOR) 20 MG tablet Take 1 tablet by mouth nightly (Patient not taking: Reported on 11/15/2024) 90 tablet 2     No current facility-administered medications for this visit.

## 2024-11-19 RX ORDER — ROSUVASTATIN CALCIUM 20 MG/1
20 TABLET, COATED ORAL EVERY EVENING
Qty: 90 TABLET | Refills: 2 | Status: SHIPPED | OUTPATIENT
Start: 2024-11-19

## 2025-03-25 ENCOUNTER — TELEPHONE (OUTPATIENT)
Dept: ENDOCRINOLOGY | Age: 25
End: 2025-03-25

## 2025-03-25 NOTE — TELEPHONE ENCOUNTER
Pt has refill that was sent to [facility] on 11/15/24.  Spoke to pt who said she declined to fill this med when she first moved here and [facility] her [facility] and she cannot fill this thru the [facility] now.  She has not called pharmacy.  Gave her # to contact pharmacy to ask for this to be filled; she says she does get other meds thru there and is already set up for home delivery. She will let me know if she runs into any issues.

## 2025-05-09 ENCOUNTER — TELEPHONE (OUTPATIENT)
Dept: ENDOCRINOLOGY | Age: 25
End: 2025-05-09

## 2025-05-15 ENCOUNTER — TELEMEDICINE (OUTPATIENT)
Dept: ENDOCRINOLOGY | Age: 25
End: 2025-05-15
Payer: COMMERCIAL

## 2025-05-15 DIAGNOSIS — E05.00 GRAVES' DISEASE: Primary | ICD-10-CM

## 2025-05-15 PROCEDURE — 99214 OFFICE O/P EST MOD 30 MIN: CPT | Performed by: INTERNAL MEDICINE

## 2025-05-15 RX ORDER — PROPYLTHIOURACIL 50 MG/1
50 TABLET ORAL 2 TIMES DAILY
Qty: 180 TABLET | Refills: 3 | Status: SHIPPED | OUTPATIENT
Start: 2025-05-15

## 2025-05-15 ASSESSMENT — ENCOUNTER SYMPTOMS
DIARRHEA: 0
CONSTIPATION: 0

## 2025-05-15 NOTE — PROGRESS NOTES
Clint Montes MD, FACE  [facility]  [address], [address]  [place]  [address]  Phone [phone]  Facsimile [phone]          Elana Georges is a 24 y.o. female seen 5/15/2025 for follow-up of Graves' disease        Elana Georges, was evaluated through a synchronous (real-time) audio-video encounter. The patient (or guardian if applicable) is aware that this is a billable service, which includes applicable co-pays. This Virtual Visit was conducted with patient's (and/or legal guardian's) consent. Patient identification was verified, and a caregiver was present when appropriate.   The patient was located at Home: [address] [address]  Provider was located at Facility (Appt Dept): [address]  [facility] [facility] [address]  [place] [address]  Confirm you are appropriately licensed, registered, or certified to deliver care in the state where the patient is located as indicated above. If you are not or unsure, please re-schedule the visit: Yes, I confirm.         ASSESSMENT AND PLAN:    1. Graves' disease  She has a longstanding history of Graves' disease, previously followed by endocrinology in [place].  She was apparently allergic to methimazole and has been treated medically with propylthiouracil for years.  Her most recent thyroid stimulating immunoglobulins were elevated 11/2024, so she does not appear to be in immunologic remission.  She is currently clinically and biochemically euthyroid.  Follow up in 6 months.      - propylthiouracil (PTU) 50 MG tablet; Take 1 tablet by mouth 2 times daily  Dispense: 180 tablet; Refill: 3      Follow-up and Dispositions    Return in about 6 months (around 11/15/2025).         HISTORY OF PRESENT ILLNESS:    THYROID DISEASE    Presentation/Diagnosis: Graves' disease diagnosed in 2018.  She was previously followed by endocrinology in [place] (Dr. Dobbs).    Symptoms: See review of systems.    Treatment: She was

## 2025-07-15 ENCOUNTER — OFFICE VISIT (OUTPATIENT)
Dept: FAMILY MEDICINE CLINIC | Facility: CLINIC | Age: 25
End: 2025-07-15
Payer: COMMERCIAL

## 2025-07-15 DIAGNOSIS — Z00.00 ANNUAL PHYSICAL EXAM: Primary | ICD-10-CM

## 2025-07-15 DIAGNOSIS — Z00.00 ANNUAL PHYSICAL EXAM: ICD-10-CM

## 2025-07-15 DIAGNOSIS — E66.3 OVERWEIGHT (BMI 25.0-29.9): ICD-10-CM

## 2025-07-15 DIAGNOSIS — E78.2 MIXED HYPERLIPIDEMIA: ICD-10-CM

## 2025-07-15 DIAGNOSIS — E05.00 GRAVES' DISEASE: ICD-10-CM

## 2025-07-15 LAB
BILIRUBIN, URINE, POC: NEGATIVE
BLOOD URINE, POC: NORMAL
GLUCOSE URINE, POC: NEGATIVE
KETONES, URINE, POC: NEGATIVE
LEUKOCYTE ESTERASE, URINE, POC: NEGATIVE
NITRITE, URINE, POC: NEGATIVE
PH, URINE, POC: 7 (ref 4.6–8)
PROTEIN,URINE, POC: NEGATIVE
SPECIFIC GRAVITY, URINE, POC: 1.01 (ref 1–1.03)
URINALYSIS CLARITY, POC: CLEAR
URINALYSIS COLOR, POC: YELLOW
UROBILINOGEN, POC: NORMAL

## 2025-07-15 PROCEDURE — 99395 PREV VISIT EST AGE 18-39: CPT | Performed by: NURSE PRACTITIONER

## 2025-07-15 PROCEDURE — 81003 URINALYSIS AUTO W/O SCOPE: CPT | Performed by: NURSE PRACTITIONER

## 2025-07-15 RX ORDER — ROSUVASTATIN CALCIUM 20 MG/1
20 TABLET, COATED ORAL EVERY EVENING
Qty: 90 TABLET | Refills: 3 | Status: SHIPPED | OUTPATIENT
Start: 2025-07-15

## 2025-07-15 SDOH — ECONOMIC STABILITY: FOOD INSECURITY: WITHIN THE PAST 12 MONTHS, YOU WORRIED THAT YOUR FOOD WOULD RUN OUT BEFORE YOU GOT MONEY TO BUY MORE.: NEVER TRUE

## 2025-07-15 SDOH — ECONOMIC STABILITY: FOOD INSECURITY: WITHIN THE PAST 12 MONTHS, THE FOOD YOU BOUGHT JUST DIDN'T LAST AND YOU DIDN'T HAVE MONEY TO GET MORE.: NEVER TRUE

## 2025-07-15 ASSESSMENT — PATIENT HEALTH QUESTIONNAIRE - PHQ9
2. FEELING DOWN, DEPRESSED OR HOPELESS: NOT AT ALL
SUM OF ALL RESPONSES TO PHQ QUESTIONS 1-9: 0
SUM OF ALL RESPONSES TO PHQ QUESTIONS 1-9: 0
1. LITTLE INTEREST OR PLEASURE IN DOING THINGS: NOT AT ALL
SUM OF ALL RESPONSES TO PHQ QUESTIONS 1-9: 0
SUM OF ALL RESPONSES TO PHQ QUESTIONS 1-9: 0

## 2025-07-15 ASSESSMENT — ENCOUNTER SYMPTOMS
SORE THROAT: 1
GASTROINTESTINAL NEGATIVE: 1
EYES NEGATIVE: 1
RESPIRATORY NEGATIVE: 1

## 2025-07-15 NOTE — PROGRESS NOTES
.  NOTICE FOR THE PATIENT: This clinical note is not designed to be interpreted by patients.  These notes may contain candid and (unintentionally) offensive descriptions, which are sometimes required for accurate documentation. If you would like more information about your healthcare, please obtain it directly by myself or my staff. Thank you for your understanding and cooperation.     Elana Georges is a 24 y.o. female who presents today for the following:  Chief Complaint   Patient presents with    Annual Exam     Routine physical after last year.  Pt wants to check health for future considerations of conceptions.         Allergies   Allergen Reactions    Latex     Tapazole [Methimazole] Hives       Current Outpatient Medications   Medication Sig Dispense Refill    rosuvastatin (CRESTOR) 20 MG tablet Take 1 tablet by mouth every evening 90 tablet 3    propylthiouracil (PTU) 50 MG tablet Take 1 tablet by mouth 2 times daily 180 tablet 3     No current facility-administered medications for this visit.       Past Medical History:   Diagnosis Date    Drug effect     Sensitive to latex allergic to methamizole    Hyperlipidemia     Hyperthyroidism 10/1/2018    Graves disease    IUD (intrauterine device) in place     Migraine 8/1/21    Sometimes get migraines when thyroid is not balanced    Thyroid disease 10/2018    Graves Disease       Past Surgical History:   Procedure Laterality Date    BREAST ENHANCEMENT SURGERY  2016    INTRAUTERINE DEVICE INSERTION  08/2016    Paraceline Shetty- removed 2022 per pt    TYMPANOSTOMY TUBE PLACEMENT      WISDOM TOOTH EXTRACTION         Social History     Tobacco Use    Smoking status: Never    Smokeless tobacco: Never   Substance Use Topics    Alcohol use: Yes     Comment: Occasional glass of wine or hard seltzer. > 3x a month        Family History   Problem Relation Age of Onset    High Cholesterol Mother     Other Father         Hyperthyroidism    Thyroid Disease Father     High

## 2025-07-15 NOTE — PATIENT INSTRUCTIONS
Patient Education        Learning About the Mediterranean Diet  What is the Mediterranean diet?     The Mediterranean diet is a style of eating rather than a diet plan. It features foods eaten in [place], [place], [place], and other countries along the [place] Sea. It emphasizes eating foods like fish, fruits, vegetables, beans, high-fiber breads and whole grains, nuts, and olive oil. This style of eating includes limited red meat, cheese, and sweets.  Why choose the Mediterranean diet?  A Mediterranean-style diet may improve heart health. It contains more fat than other heart-healthy diets. But the fats are mainly from nuts, unsaturated oils (such as fish oils and olive oil), and certain nut or seed oils (such as canola, soybean, or flaxseed oil). These fats may help protect the heart and blood vessels.  How can you get started on the Mediterranean diet?  Here are some things you can do to switch to a more Mediterranean way of eating.  What to eat  Eat a variety of fruits and vegetables each day, such as grapes, blueberries, tomatoes, broccoli, peppers, figs, olives, spinach, eggplant, beans, lentils, and chickpeas.  Eat a variety of whole-grain foods each day, such as oats, brown rice, and whole wheat bread, pasta, and couscous.  Eat fish at least 2 times a week. Try tuna, salmon, mackerel, lake trout, herring, or sardines.  Eat moderate amounts of low-fat dairy products, such as milk, cheese, or yogurt.  Eat moderate amounts of poultry and eggs.  Choose healthy (unsaturated) fats, such as nuts, olive oil, and certain nut or seed oils like canola, soybean, and flaxseed.  Limit unhealthy (saturated) fats, such as butter, palm oil, and coconut oil. And limit fats found in animal products, such as meat and dairy products made with whole milk. Try to eat red meat only a few times a month in very small amounts.  Limit sweets and desserts to only a few times a week. This includes sugar-sweetened